# Patient Record
Sex: MALE | Race: OTHER | HISPANIC OR LATINO | URBAN - METROPOLITAN AREA
[De-identification: names, ages, dates, MRNs, and addresses within clinical notes are randomized per-mention and may not be internally consistent; named-entity substitution may affect disease eponyms.]

---

## 2019-04-20 ENCOUNTER — INPATIENT (INPATIENT)
Facility: HOSPITAL | Age: 59
LOS: 0 days | Discharge: HOME | DRG: 313 | End: 2019-04-21
Attending: HOSPITALIST | Admitting: HOSPITALIST
Payer: COMMERCIAL

## 2019-04-20 VITALS
HEIGHT: 66 IN | OXYGEN SATURATION: 98 % | DIASTOLIC BLOOD PRESSURE: 96 MMHG | HEART RATE: 58 BPM | WEIGHT: 223.99 LBS | TEMPERATURE: 98 F | RESPIRATION RATE: 16 BRPM | SYSTOLIC BLOOD PRESSURE: 171 MMHG

## 2019-04-20 DIAGNOSIS — I24.9 ACUTE ISCHEMIC HEART DISEASE, UNSPECIFIED: ICD-10-CM

## 2019-04-20 DIAGNOSIS — E78.00 PURE HYPERCHOLESTEROLEMIA, UNSPECIFIED: ICD-10-CM

## 2019-04-20 DIAGNOSIS — I10 ESSENTIAL (PRIMARY) HYPERTENSION: ICD-10-CM

## 2019-04-20 DIAGNOSIS — E66.9 OBESITY, UNSPECIFIED: ICD-10-CM

## 2019-04-20 DIAGNOSIS — I21.4 NON-ST ELEVATION (NSTEMI) MYOCARDIAL INFARCTION: ICD-10-CM

## 2019-04-20 LAB
ALBUMIN SERPL ELPH-MCNC: 4.1 G/DL — SIGNIFICANT CHANGE UP (ref 3.3–5.2)
ALP SERPL-CCNC: 83 U/L — SIGNIFICANT CHANGE UP (ref 40–120)
ALT FLD-CCNC: 34 U/L — SIGNIFICANT CHANGE UP
ANION GAP SERPL CALC-SCNC: 11 MMOL/L — SIGNIFICANT CHANGE UP (ref 5–17)
APTT BLD: 28.5 SEC — SIGNIFICANT CHANGE UP (ref 27.5–36.3)
APTT BLD: 29.9 SEC — SIGNIFICANT CHANGE UP (ref 27.5–36.3)
AST SERPL-CCNC: 26 U/L — SIGNIFICANT CHANGE UP
BASOPHILS # BLD AUTO: 0 K/UL — SIGNIFICANT CHANGE UP (ref 0–0.2)
BASOPHILS NFR BLD AUTO: 0.5 % — SIGNIFICANT CHANGE UP (ref 0–2)
BILIRUB SERPL-MCNC: 0.2 MG/DL — LOW (ref 0.4–2)
BUN SERPL-MCNC: 17 MG/DL — SIGNIFICANT CHANGE UP (ref 8–20)
CALCIUM SERPL-MCNC: 8.6 MG/DL — SIGNIFICANT CHANGE UP (ref 8.6–10.2)
CHLORIDE SERPL-SCNC: 103 MMOL/L — SIGNIFICANT CHANGE UP (ref 98–107)
CK MB CFR SERPL CALC: 4.5 NG/ML — SIGNIFICANT CHANGE UP (ref 0–6.7)
CK MB CFR SERPL CALC: 6.9 NG/ML — HIGH (ref 0–6.7)
CK SERPL-CCNC: 612 U/L — HIGH (ref 30–200)
CK SERPL-CCNC: 702 U/L — HIGH (ref 30–200)
CO2 SERPL-SCNC: 22 MMOL/L — SIGNIFICANT CHANGE UP (ref 22–29)
CREAT SERPL-MCNC: 0.86 MG/DL — SIGNIFICANT CHANGE UP (ref 0.5–1.3)
EOSINOPHIL # BLD AUTO: 0.3 K/UL — SIGNIFICANT CHANGE UP (ref 0–0.5)
EOSINOPHIL NFR BLD AUTO: 4 % — SIGNIFICANT CHANGE UP (ref 0–5)
GLUCOSE SERPL-MCNC: 118 MG/DL — HIGH (ref 70–115)
HCT VFR BLD CALC: 44.4 % — SIGNIFICANT CHANGE UP (ref 42–52)
HCT VFR BLD CALC: 45 % — SIGNIFICANT CHANGE UP (ref 42–52)
HGB BLD-MCNC: 14.7 G/DL — SIGNIFICANT CHANGE UP (ref 14–18)
HGB BLD-MCNC: 14.9 G/DL — SIGNIFICANT CHANGE UP (ref 14–18)
HIV 1 & 2 AB SERPL IA.RAPID: SIGNIFICANT CHANGE UP
INR BLD: 0.9 RATIO — SIGNIFICANT CHANGE UP (ref 0.88–1.16)
LYMPHOCYTES # BLD AUTO: 1.8 K/UL — SIGNIFICANT CHANGE UP (ref 1–4.8)
LYMPHOCYTES # BLD AUTO: 26.8 % — SIGNIFICANT CHANGE UP (ref 20–55)
MCHC RBC-ENTMCNC: 31.7 PG — HIGH (ref 27–31)
MCHC RBC-ENTMCNC: 32.3 PG — HIGH (ref 27–31)
MCHC RBC-ENTMCNC: 32.7 G/DL — SIGNIFICANT CHANGE UP (ref 32–36)
MCHC RBC-ENTMCNC: 33.6 G/DL — SIGNIFICANT CHANGE UP (ref 32–36)
MCV RBC AUTO: 96.1 FL — HIGH (ref 80–94)
MCV RBC AUTO: 97 FL — HIGH (ref 80–94)
MONOCYTES # BLD AUTO: 0.6 K/UL — SIGNIFICANT CHANGE UP (ref 0–0.8)
MONOCYTES NFR BLD AUTO: 8.8 % — SIGNIFICANT CHANGE UP (ref 3–10)
NEUTROPHILS # BLD AUTO: 3.9 K/UL — SIGNIFICANT CHANGE UP (ref 1.8–8)
NEUTROPHILS NFR BLD AUTO: 59.6 % — SIGNIFICANT CHANGE UP (ref 37–73)
PLATELET # BLD AUTO: 277 K/UL — SIGNIFICANT CHANGE UP (ref 150–400)
PLATELET # BLD AUTO: 327 K/UL — SIGNIFICANT CHANGE UP (ref 150–400)
POTASSIUM SERPL-MCNC: 3.8 MMOL/L — SIGNIFICANT CHANGE UP (ref 3.5–5.3)
POTASSIUM SERPL-SCNC: 3.8 MMOL/L — SIGNIFICANT CHANGE UP (ref 3.5–5.3)
PROT SERPL-MCNC: 6.8 G/DL — SIGNIFICANT CHANGE UP (ref 6.6–8.7)
PROTHROM AB SERPL-ACNC: 10.3 SEC — SIGNIFICANT CHANGE UP (ref 10–12.9)
RBC # BLD: 4.62 M/UL — SIGNIFICANT CHANGE UP (ref 4.6–6.2)
RBC # BLD: 4.64 M/UL — SIGNIFICANT CHANGE UP (ref 4.6–6.2)
RBC # FLD: 12.8 % — SIGNIFICANT CHANGE UP (ref 11–15.6)
RBC # FLD: 13 % — SIGNIFICANT CHANGE UP (ref 11–15.6)
SODIUM SERPL-SCNC: 136 MMOL/L — SIGNIFICANT CHANGE UP (ref 135–145)
TROPONIN T SERPL-MCNC: 0.02 NG/ML — SIGNIFICANT CHANGE UP (ref 0–0.06)
TROPONIN T SERPL-MCNC: 0.05 NG/ML — SIGNIFICANT CHANGE UP (ref 0–0.06)
TROPONIN T SERPL-MCNC: 0.07 NG/ML — HIGH (ref 0–0.06)
TROPONIN T SERPL-MCNC: 0.08 NG/ML — HIGH (ref 0–0.06)
TROPONIN T SERPL-MCNC: 0.17 NG/ML — HIGH (ref 0–0.06)
WBC # BLD: 6.6 K/UL — SIGNIFICANT CHANGE UP (ref 4.8–10.8)
WBC # BLD: 7 K/UL — SIGNIFICANT CHANGE UP (ref 4.8–10.8)
WBC # FLD AUTO: 6.6 K/UL — SIGNIFICANT CHANGE UP (ref 4.8–10.8)
WBC # FLD AUTO: 7 K/UL — SIGNIFICANT CHANGE UP (ref 4.8–10.8)

## 2019-04-20 PROCEDURE — 99223 1ST HOSP IP/OBS HIGH 75: CPT

## 2019-04-20 PROCEDURE — 99218: CPT

## 2019-04-20 PROCEDURE — 93306 TTE W/DOPPLER COMPLETE: CPT | Mod: 26

## 2019-04-20 PROCEDURE — 71045 X-RAY EXAM CHEST 1 VIEW: CPT | Mod: 26

## 2019-04-20 PROCEDURE — 93010 ELECTROCARDIOGRAM REPORT: CPT | Mod: 76

## 2019-04-20 RX ORDER — HEPARIN SODIUM 5000 [USP'U]/ML
6000 INJECTION INTRAVENOUS; SUBCUTANEOUS EVERY 6 HOURS
Qty: 0 | Refills: 0 | Status: DISCONTINUED | OUTPATIENT
Start: 2019-04-20 | End: 2019-04-20

## 2019-04-20 RX ORDER — INFLUENZA VIRUS VACCINE 15; 15; 15; 15 UG/.5ML; UG/.5ML; UG/.5ML; UG/.5ML
0.5 SUSPENSION INTRAMUSCULAR ONCE
Qty: 0 | Refills: 0 | Status: COMPLETED | OUTPATIENT
Start: 2019-04-20 | End: 2019-04-20

## 2019-04-20 RX ORDER — SODIUM CHLORIDE 9 MG/ML
1000 INJECTION INTRAMUSCULAR; INTRAVENOUS; SUBCUTANEOUS ONCE
Qty: 0 | Refills: 0 | Status: COMPLETED | OUTPATIENT
Start: 2019-04-20 | End: 2019-04-20

## 2019-04-20 RX ORDER — HEPARIN SODIUM 5000 [USP'U]/ML
INJECTION INTRAVENOUS; SUBCUTANEOUS
Qty: 25000 | Refills: 0 | Status: DISCONTINUED | OUTPATIENT
Start: 2019-04-20 | End: 2019-04-20

## 2019-04-20 RX ORDER — CLOPIDOGREL BISULFATE 75 MG/1
75 TABLET, FILM COATED ORAL DAILY
Qty: 0 | Refills: 0 | Status: DISCONTINUED | OUTPATIENT
Start: 2019-04-20 | End: 2019-04-21

## 2019-04-20 RX ORDER — HEPARIN SODIUM 5000 [USP'U]/ML
5000 INJECTION INTRAVENOUS; SUBCUTANEOUS ONCE
Qty: 0 | Refills: 0 | Status: COMPLETED | OUTPATIENT
Start: 2019-04-20 | End: 2019-04-20

## 2019-04-20 RX ORDER — ASPIRIN/CALCIUM CARB/MAGNESIUM 324 MG
81 TABLET ORAL DAILY
Qty: 0 | Refills: 0 | Status: DISCONTINUED | OUTPATIENT
Start: 2019-04-20 | End: 2019-04-21

## 2019-04-20 RX ORDER — SODIUM CHLORIDE 9 MG/ML
3 INJECTION INTRAMUSCULAR; INTRAVENOUS; SUBCUTANEOUS ONCE
Qty: 0 | Refills: 0 | Status: COMPLETED | OUTPATIENT
Start: 2019-04-20 | End: 2019-04-20

## 2019-04-20 RX ORDER — LISINOPRIL 2.5 MG/1
10 TABLET ORAL DAILY
Qty: 0 | Refills: 0 | Status: DISCONTINUED | OUTPATIENT
Start: 2019-04-20 | End: 2019-04-21

## 2019-04-20 RX ORDER — ENOXAPARIN SODIUM 100 MG/ML
100 INJECTION SUBCUTANEOUS
Qty: 0 | Refills: 0 | Status: DISCONTINUED | OUTPATIENT
Start: 2019-04-20 | End: 2019-04-21

## 2019-04-20 RX ORDER — METOPROLOL TARTRATE 50 MG
25 TABLET ORAL
Qty: 0 | Refills: 0 | Status: DISCONTINUED | OUTPATIENT
Start: 2019-04-20 | End: 2019-04-21

## 2019-04-20 RX ADMIN — SODIUM CHLORIDE 1000 MILLILITER(S): 9 INJECTION INTRAMUSCULAR; INTRAVENOUS; SUBCUTANEOUS at 08:41

## 2019-04-20 RX ADMIN — SODIUM CHLORIDE 3 MILLILITER(S): 9 INJECTION INTRAMUSCULAR; INTRAVENOUS; SUBCUTANEOUS at 06:45

## 2019-04-20 RX ADMIN — SODIUM CHLORIDE 1000 MILLILITER(S): 9 INJECTION INTRAMUSCULAR; INTRAVENOUS; SUBCUTANEOUS at 10:32

## 2019-04-20 RX ADMIN — Medication 81 MILLIGRAM(S): at 17:23

## 2019-04-20 RX ADMIN — ENOXAPARIN SODIUM 100 MILLIGRAM(S): 100 INJECTION SUBCUTANEOUS at 21:10

## 2019-04-20 RX ADMIN — HEPARIN SODIUM 5000 UNIT(S): 5000 INJECTION INTRAVENOUS; SUBCUTANEOUS at 11:05

## 2019-04-20 RX ADMIN — LISINOPRIL 10 MILLIGRAM(S): 2.5 TABLET ORAL at 17:23

## 2019-04-20 RX ADMIN — HEPARIN SODIUM 1000 UNIT(S)/HR: 5000 INJECTION INTRAVENOUS; SUBCUTANEOUS at 11:06

## 2019-04-20 RX ADMIN — Medication 25 MILLIGRAM(S): at 17:37

## 2019-04-20 RX ADMIN — CLOPIDOGREL BISULFATE 75 MILLIGRAM(S): 75 TABLET, FILM COATED ORAL at 17:23

## 2019-04-20 NOTE — ED ADULT NURSE REASSESSMENT NOTE - GENERAL PATIENT STATE
cooperative/resting/sleeping/comfortable appearance/improvement verbalized/family/SO at bedside/smiling/interactive

## 2019-04-20 NOTE — ED ADULT TRIAGE NOTE - CHIEF COMPLAINT QUOTE
Patient A&Ox4, denies any pain or discomfort. Stated woke up from sleep appx 40 mins ago & felt pain to center of chest. Denies any pain at this time. Stated BP was 202/100 & took 25mg Metoprolol. Patient received 324mg baby asa PTA.

## 2019-04-20 NOTE — ED CDU PROVIDER INITIAL DAY NOTE - ATTENDING CONTRIBUTION TO CARE
Shell SINHA- 59 Y/O M with h/o htn, hld, cad, s/p pic with stent few yrs ago p/w chest pain last night, no fever, chills, cough, nausea, vomiting, diarrhea.  No chest pain now, placed in cdu for stress test    pt is alert, well appearing male, s1s2 normal reg, b/l clear breath sounds, abd soft, nt, nd, normal bowel sounds, neuro exam aox3, cn 2-12 intact, no focal deficits, skin arm, dry, good turgor

## 2019-04-20 NOTE — ED ADULT NURSE NOTE - OBJECTIVE STATEMENT
Patient present to ED with c/o of chest pain that began at 5 am. Pt denies any N/V/SOB/ Weakness or syncope. Patient presents to ED A/Ox4, VSS, Respirations are even and unlabored, lungs cta, +bowel x4 quads, abdomen soft, nontender/nondistended, skin w/d/i.

## 2019-04-20 NOTE — ED PROVIDER NOTE - OBJECTIVE STATEMENT
59 yo M presents to ED c/o non-radiating substernal chest pain which started at 0510 this AM after waking up to go to the bathroom.  Pt denies any assoc radiation, N/V, diaphoresis or syncope.  Pt with hx of CAD s/p coronary stenting x 1 in 2010.  Pt followed by [facility]/Dr. D'Agate.

## 2019-04-20 NOTE — ED CDU PROVIDER DISPOSITION NOTE - ATTENDING CONTRIBUTION TO CARE
Shell SINHA- 59 Y/O M with h/o htn, hld, cad, s/p pci with stent few yrs ago p/w chest pain last night, no fever, chills, cough, nausea, vomiting, diarrhea.  No chest pain now, placed in cdu for stress test    pt is alert, well appearing male, s1s2 normal reg, b/l clear breath sounds, abd soft, nt, nd, normal bowel sounds, neuro exam aox3, cn 2-12 intact, no focal deficits, skin arm, dry, good turgor.

## 2019-04-20 NOTE — ED CDU PROVIDER INITIAL DAY NOTE - NOTES
Pt with + second tropin. EKG no changes. Case d/w Cardio, heparin initiated and admitted to hospitalist service

## 2019-04-20 NOTE — H&P ADULT - PROBLEM SELECTOR PLAN 1
Aspirin 81mg po daily   Plavix 75mg po daily   metoprolol 25mg po bid  Lovenox therapeutic dosing    trend troponin   TSH , Lipid profile fasting.   Cardiology is following. Cardiac heart group dr Mackey for Monday 4/22/19  Planning a cardiac cath Aspirin 81mg po daily   Plavix 75mg po daily   metoprolol 25mg po bid  Lovenox therapeutic dosing    trend troponin   TSH , Lipid profile fasting.   Cardiology is following.  dr Mackey of [facility] plan cardiac cath for Monday 4/22/19  Planning a cardiac cath

## 2019-04-20 NOTE — ED PROVIDER NOTE - CLINICAL SUMMARY MEDICAL DECISION MAKING FREE TEXT BOX
EKG NSR with no acute changes,  Pt with significant cardiac hx.  will monitor, check serial CE and have Cardio eval pt

## 2019-04-20 NOTE — ED ADULT NURSE NOTE - CHPI ED NUR SYMPTOMS NEG
no diaphoresis/no fever/no chills/no back pain/no shortness of breath/no congestion/no dizziness/no vomiting/no syncope

## 2019-04-20 NOTE — CONSULT NOTE ADULT - SUBJECTIVE AND OBJECTIVE BOX
[facility]                                                    [address], [address], [place] [address]                                                         PHONE: [phone]    FAX: [phone] [address], [address], [place] [address]                                                 PHONE: [phone]    FAX: [phone]  *******************************************************************************    Reason for Consult: Chest pain    HPI:  MARY BETH CHA is a 58y Male with PMH of CAD, remote stent in 2010 when he was treated with dual antiplatelet regimen with Plavix and ASA for a year and now on ASA only presents to ED c/o non-radiating substernal chest pain which started at 0510 this AM after waking up to go to the bathroom.  Pt denies any assoc radiation, N/V, diaphoresis or syncope. In ER he was found to have uncontrolled HTN with BP of 171/96, initial Trop negative and second Trop 0.17. Comfortable in the bed at present time, with no recurrent CP, no new cardiac c/o. Last cardiac w/u in the office more then year ago.     PAST MEDICAL & SURGICAL HISTORY:  HTN (hypertension)  Stented coronary artery  High cholesterol: UNKNOWN MEDICINE      No Known Allergies      MEDICATIONS  (STANDING):  heparin  Infusion.  Unit(s)/Hr (10 mL/Hr) IV Continuous <Continuous>  influenza   Vaccine 0.5 milliLiter(s) IntraMuscular once    MEDICATIONS  (PRN):  heparin  Injectable 6000 Unit(s) IV Push every 6 hours PRN For aPTT less than 40      Social History: no active tobacco / EtOH / IVDA    Family History:     ROS: As noted above, otherwise unremarkable.    Vital Signs Last 24 Hrs  T(C): 36.7 (20 Apr 2019 11:45), Max: 36.8 (20 Apr 2019 05:46)  T(F): 98.1 (20 Apr 2019 11:45), Max: 98.2 (20 Apr 2019 05:46)  HR: 58 (20 Apr 2019 11:45) (58 - 59)  BP: 154/90 (20 Apr 2019 11:45) (149/84 - 171/96)  BP(mean): 112 (20 Apr 2019 11:45) (112 - 112)  RR: 18 (20 Apr 2019 11:45) (16 - 18)  SpO2: 99% (20 Apr 2019 11:45) (98% - 99%)    I&O's Detail    I&O's Summary      PHYSICAL EXAM:  General: Appears well developed, well nourished, no acute distress  HEENT: Head: normocephalic, atraumatic  Eyes: Pupils equal and reactive  Neck: Supple, no carotid bruit, no JVD, no HJR  CARDIOVASCULAR: Normal S1 and S2, no murmur, rub, or gallop  LUNGS: Clear to auscultation bilaterally, no rales, rhonchi or wheeze  ABDOMEN: Soft, nontender, non-distended, positive bowel sounds, no mass or bruit  EXTREMITIES: No edema, distal pulses WNL  SKIN: Warm and dry with normal turgor  NEURO: Alert & oriented x 3, grossly intact  PSYCH: normal mood and affect    LABS:                        14.9   6.6   )-----------( 327      ( 20 Apr 2019 06:49 )             44.4     04-20    136  |  103  |  17.0  ----------------------------<  118<H>  3.8   |  22.0  |  0.86    Ca    8.6      20 Apr 2019 06:49    TPro  6.8  /  Alb  4.1  /  TBili  0.2<L>  /  DBili  x   /  AST  26  /  ALT  34  /  AlkPhos  83  04-20    CARDIAC MARKERS ( 20 Apr 2019 09:33 )  x     / 0.17 ng/mL / x     / x     / x      CARDIAC MARKERS ( 20 Apr 2019 06:49 )  x     / 0.02 ng/mL / 612 U/L / x     / 4.5 ng/mL      PT/INR - ( 20 Apr 2019 06:49 )   PT: 10.3 sec;   INR: 0.90 ratio         PTT - ( 20 Apr 2019 06:49 )  PTT:29.9 sec    RADIOLOGY & ADDITIONAL STUDIES:    ECG: < from: 12 Lead ECG (04.20.19 @ 09:54) >  Sinus bradycardia  Otherwise normal ECG      Assessment and Plan:  In summary, MARY BETH CHA is a 58y Male with past medical history significant for with PMH of CAD, remote stent in 2010 when he was treated with dual antiplatelet regimen with Plavix and ASA for a year and now on ASA only presents to ED c/o non-radiating substernal chest pain which started at 0510 this AM after waking up to go to the bathroom.  Pt denies any assoc symptoms. In ER he was found to have uncontrolled HTN with BP of 171/96, initial Trop negative and second Trop 0.17. No clinical signs of overt CHF, cardiac ischemia. Borderline CE most likely secondary to uncontrolled HTN. Admitted with chest pain, uncontrolled HTN, rule ACS.        - Monitor on telemetry  - Check troponin x3  - Repeat EKGs  - Echocardiogram  - ASA/statins/ACEI/BB for now   - If he develops recurrent CP and or CE continue to trend up, will consider for Cardiac Catheterization monday. .  - Rhythm/hemodynamics stable   - AC consider Lovenox 1 mg/kg S/Q every 12 hrs for now     We will follow with you.  Thank you for allowing me to participate in the care of your patient.      Sincerely,

## 2019-04-20 NOTE — ED CDU PROVIDER DISPOSITION NOTE - CLINICAL COURSE
Pt presented to to ED with CP which resolved after 30min. Pt had serial trop and ruled in after second troponin. Heparin intimated, pending cardio and admitted to hospitalist service. Case d/w Dr Price

## 2019-04-20 NOTE — H&P ADULT - ASSESSMENT
58 yr male with CAD and previous cardiac stent placement presenting with chest pain and positive troponin concerning for acute coronary syndrome.

## 2019-04-20 NOTE — H&P ADULT - NSHPPHYSICALEXAM_GEN_ALL_CORE
Obese   Normocephalic   EOMI PERRL  Neck supple NO JVD  S1 and S2 regular   CTA B  Abd soft + BS not tender   No pedal edema. No calf tenderness  AAOX3 no focal neurologic deficit  No skin rash , no skin eruption

## 2019-04-20 NOTE — H&P ADULT - NSICDXPASTMEDICALHX_GEN_ALL_CORE_FT
PAST MEDICAL HISTORY:  High cholesterol UNKNOWN MEDICINE    HTN (hypertension)     Stented coronary artery

## 2019-04-20 NOTE — ED CDU PROVIDER INITIAL DAY NOTE - OBJECTIVE STATEMENT
59 yo M PMH HTN, HLD, CAD/PCI in 2010 in ASA/Plavix presents to ED c/o non-radiating substernal chest pain which started at 0510 this AM after waking up to go to the bathroom.  Pain 7/10 and lasted approx 30 min. Pt denies any assoc radiation, N/V, diaphoresis or syncope.    Pt followed by [facility]/Dr. D'Agate. 57 yo M PMH HTN, HLD, CAD/PCI in 2010 in ASA/Plavix presents to ED c/o non-radiating substernal chest pain which started at 0510 this AM after waking up to go to the bathroom.  Pain 7/10 and lasted approx 30 min. Pt took ASA and metoprolol PTA. denies any assoc radiation, N/V, diaphoresis or syncope.    Pt followed by [facility]/Dr. D'Agate.

## 2019-04-20 NOTE — H&P ADULT - HISTORY OF PRESENT ILLNESS
58 yr male that is obese, history of CAD had stent placed 2010, Hypertension , Dyslipidemia. Present substernal chest pain started 5am day of admission, lasted about 30 minutes. Relieved with total 4 tabs of baby aspirin and 25mg metoprolol pill.  No diaphoresis, no dizziness ,no nausea or vomiting.  In ED second set of cardiac enzyme showing troponin elevation 0.17.  EKG normal sinus. CXR no infiltrates.

## 2019-04-21 ENCOUNTER — TRANSCRIPTION ENCOUNTER (OUTPATIENT)
Age: 59
End: 2019-04-21

## 2019-04-21 VITALS
DIASTOLIC BLOOD PRESSURE: 81 MMHG | SYSTOLIC BLOOD PRESSURE: 122 MMHG | HEART RATE: 73 BPM | OXYGEN SATURATION: 95 % | RESPIRATION RATE: 18 BRPM | TEMPERATURE: 98 F

## 2019-04-21 LAB
ANION GAP SERPL CALC-SCNC: 13 MMOL/L — SIGNIFICANT CHANGE UP (ref 5–17)
BUN SERPL-MCNC: 19 MG/DL — SIGNIFICANT CHANGE UP (ref 8–20)
CALCIUM SERPL-MCNC: 9.2 MG/DL — SIGNIFICANT CHANGE UP (ref 8.6–10.2)
CHLORIDE SERPL-SCNC: 102 MMOL/L — SIGNIFICANT CHANGE UP (ref 98–107)
CHOLEST SERPL-MCNC: 242 MG/DL — HIGH (ref 110–199)
CO2 SERPL-SCNC: 24 MMOL/L — SIGNIFICANT CHANGE UP (ref 22–29)
CREAT SERPL-MCNC: 0.98 MG/DL — SIGNIFICANT CHANGE UP (ref 0.5–1.3)
GLUCOSE SERPL-MCNC: 91 MG/DL — SIGNIFICANT CHANGE UP (ref 70–115)
HAV IGM SER-ACNC: SIGNIFICANT CHANGE UP
HBV CORE IGM SER-ACNC: SIGNIFICANT CHANGE UP
HBV SURFACE AG SER-ACNC: SIGNIFICANT CHANGE UP
HCT VFR BLD CALC: 47.2 % — SIGNIFICANT CHANGE UP (ref 42–52)
HCV AB S/CO SERPL IA: 0.07 S/CO — SIGNIFICANT CHANGE UP (ref 0–0.99)
HCV AB S/CO SERPL IA: 0.07 S/CO — SIGNIFICANT CHANGE UP (ref 0–0.99)
HCV AB SERPL-IMP: SIGNIFICANT CHANGE UP
HCV AB SERPL-IMP: SIGNIFICANT CHANGE UP
HDLC SERPL-MCNC: 35 MG/DL — LOW
HGB BLD-MCNC: 15.6 G/DL — SIGNIFICANT CHANGE UP (ref 14–18)
LIPID PNL WITH DIRECT LDL SERPL: 157 MG/DL — SIGNIFICANT CHANGE UP
MCHC RBC-ENTMCNC: 32.1 PG — HIGH (ref 27–31)
MCHC RBC-ENTMCNC: 33.1 G/DL — SIGNIFICANT CHANGE UP (ref 32–36)
MCV RBC AUTO: 97.1 FL — HIGH (ref 80–94)
PLATELET # BLD AUTO: 292 K/UL — SIGNIFICANT CHANGE UP (ref 150–400)
POTASSIUM SERPL-MCNC: 3.9 MMOL/L — SIGNIFICANT CHANGE UP (ref 3.5–5.3)
POTASSIUM SERPL-SCNC: 3.9 MMOL/L — SIGNIFICANT CHANGE UP (ref 3.5–5.3)
RBC # BLD: 4.86 M/UL — SIGNIFICANT CHANGE UP (ref 4.6–6.2)
RBC # FLD: 13 % — SIGNIFICANT CHANGE UP (ref 11–15.6)
SODIUM SERPL-SCNC: 139 MMOL/L — SIGNIFICANT CHANGE UP (ref 135–145)
TOTAL CHOLESTEROL/HDL RATIO MEASUREMENT: 7 RATIO — SIGNIFICANT CHANGE UP (ref 3.4–9.6)
TRIGL SERPL-MCNC: 248 MG/DL — HIGH (ref 10–200)
TSH SERPL-MCNC: 1.66 UIU/ML — SIGNIFICANT CHANGE UP (ref 0.27–4.2)
WBC # BLD: 7.5 K/UL — SIGNIFICANT CHANGE UP (ref 4.8–10.8)
WBC # FLD AUTO: 7.5 K/UL — SIGNIFICANT CHANGE UP (ref 4.8–10.8)

## 2019-04-21 PROCEDURE — C8929: CPT

## 2019-04-21 PROCEDURE — G0378: CPT

## 2019-04-21 PROCEDURE — 85027 COMPLETE CBC AUTOMATED: CPT

## 2019-04-21 PROCEDURE — 71045 X-RAY EXAM CHEST 1 VIEW: CPT

## 2019-04-21 PROCEDURE — 36415 COLL VENOUS BLD VENIPUNCTURE: CPT

## 2019-04-21 PROCEDURE — 84484 ASSAY OF TROPONIN QUANT: CPT

## 2019-04-21 PROCEDURE — 80053 COMPREHEN METABOLIC PANEL: CPT

## 2019-04-21 PROCEDURE — 85730 THROMBOPLASTIN TIME PARTIAL: CPT

## 2019-04-21 PROCEDURE — 86803 HEPATITIS C AB TEST: CPT

## 2019-04-21 PROCEDURE — 80061 LIPID PANEL: CPT

## 2019-04-21 PROCEDURE — 96361 HYDRATE IV INFUSION ADD-ON: CPT

## 2019-04-21 PROCEDURE — 86703 HIV-1/HIV-2 1 RESULT ANTBDY: CPT

## 2019-04-21 PROCEDURE — 85610 PROTHROMBIN TIME: CPT

## 2019-04-21 PROCEDURE — 80048 BASIC METABOLIC PNL TOTAL CA: CPT

## 2019-04-21 PROCEDURE — 99239 HOSP IP/OBS DSCHRG MGMT >30: CPT

## 2019-04-21 PROCEDURE — T1013: CPT

## 2019-04-21 PROCEDURE — 82553 CREATINE MB FRACTION: CPT

## 2019-04-21 PROCEDURE — 93005 ELECTROCARDIOGRAM TRACING: CPT

## 2019-04-21 PROCEDURE — 99285 EMERGENCY DEPT VISIT HI MDM: CPT | Mod: 25

## 2019-04-21 PROCEDURE — 80074 ACUTE HEPATITIS PANEL: CPT

## 2019-04-21 PROCEDURE — 82550 ASSAY OF CK (CPK): CPT

## 2019-04-21 PROCEDURE — 93010 ELECTROCARDIOGRAM REPORT: CPT

## 2019-04-21 PROCEDURE — 96360 HYDRATION IV INFUSION INIT: CPT

## 2019-04-21 PROCEDURE — 84443 ASSAY THYROID STIM HORMONE: CPT

## 2019-04-21 RX ORDER — LISINOPRIL 2.5 MG/1
10 TABLET ORAL ONCE
Qty: 0 | Refills: 0 | Status: COMPLETED | OUTPATIENT
Start: 2019-04-21 | End: 2019-04-21

## 2019-04-21 RX ORDER — LISINOPRIL 2.5 MG/1
1 TABLET ORAL
Qty: 30 | Refills: 0
Start: 2019-04-21 | End: 2019-05-20

## 2019-04-21 RX ORDER — METOPROLOL TARTRATE 50 MG
1 TABLET ORAL
Qty: 60 | Refills: 0
Start: 2019-04-21 | End: 2019-05-20

## 2019-04-21 RX ORDER — LISINOPRIL 2.5 MG/1
20 TABLET ORAL DAILY
Qty: 0 | Refills: 0 | Status: DISCONTINUED | OUTPATIENT
Start: 2019-04-21 | End: 2019-04-21

## 2019-04-21 RX ORDER — ASPIRIN/CALCIUM CARB/MAGNESIUM 324 MG
1 TABLET ORAL
Qty: 30 | Refills: 0
Start: 2019-04-21 | End: 2019-05-20

## 2019-04-21 RX ORDER — LISINOPRIL 2.5 MG/1
20 TABLET ORAL DAILY
Qty: 0 | Refills: 0 | Status: CANCELLED | OUTPATIENT
Start: 2019-04-22 | End: 2019-04-21

## 2019-04-21 RX ADMIN — LISINOPRIL 10 MILLIGRAM(S): 2.5 TABLET ORAL at 09:11

## 2019-04-21 RX ADMIN — CLOPIDOGREL BISULFATE 75 MILLIGRAM(S): 75 TABLET, FILM COATED ORAL at 07:31

## 2019-04-21 RX ADMIN — Medication 25 MILLIGRAM(S): at 05:08

## 2019-04-21 RX ADMIN — LISINOPRIL 10 MILLIGRAM(S): 2.5 TABLET ORAL at 05:08

## 2019-04-21 RX ADMIN — Medication 81 MILLIGRAM(S): at 07:31

## 2019-04-21 RX ADMIN — ENOXAPARIN SODIUM 100 MILLIGRAM(S): 100 INJECTION SUBCUTANEOUS at 07:30

## 2019-04-21 NOTE — DISCHARGE NOTE PROVIDER - HOSPITAL COURSE
58 yr male that is obese, history of CAD had stent placed 2010, Hypertension , Dyslipidemia. Present substernal chest pain started 5am day of admission, lasted about 30 minutes. Relieved with total 4 tabs of baby aspirin and 25mg metoprolol pill.  No diaphoresis, no dizziness ,no nausea or vomiting.  In ED second set of cardiac enzyme showing troponin elevation 0.17.  EKG normal sinus. CXR no infiltrates.      58 yr male with CAD and previous cardiac stent placement presenting with chest pain and positive troponin concerning for acute coronary syndrome.         ·  Problem: Acute coronary syndromes.  Plan: Aspirin 81mg po daily     Plavix 75mg po daily     metoprolol 25mg po bid    troponin trending down    TSH  WNL     Lipid profile fasting.  Cholesterol 242. Triglyceride 248.      Cardiology is following.  dr Mackey of [facility] follow up outpatient in I week        ·  Problem: Essential hypertension.  Plan:     metoprolol 25mg po bid     Lisinopril 20mg po daily.          Problem/Plan - 3:    ·  Problem: High cholesterol.  Plan: Atorvastatin 40mg po daily.          Problem/Plan - 4:    ·  Problem: Obesity (BMI 30-39.9).  Plan: Counselled on life style modification via diet and exercise for weight loss.

## 2019-04-21 NOTE — DISCHARGE NOTE NURSING/CASE MANAGEMENT/SOCIAL WORK - NSDCDPATPORTLINK_GEN_ALL_CORE
You can access the [facility] Patient Portal, offered by [facility], by registering with the following website: [email or web address]

## 2019-04-21 NOTE — DISCHARGE NOTE PROVIDER - NSDCCPCAREPLAN_GEN_ALL_CORE_FT
PRINCIPAL DISCHARGE DIAGNOSIS  Diagnosis: NSTEMI (non-ST elevated myocardial infarction)  Assessment and Plan of Treatment:

## 2019-04-21 NOTE — DISCHARGE NOTE PROVIDER - CARE PROVIDER_API CALL
Salvador Mackey)  Cardiac Electrophysiology; Cardiovascular Disease  [address], [address]  [place] [address]  Phone: [phone]  Fax: [phone]  Follow Up Time:     Radha Loyd)  Family Medicine  [address]  [place] [address]  Phone: [phone]  Fax: [phone]  Follow Up Time:

## 2019-04-21 NOTE — PROGRESS NOTE ADULT - SUBJECTIVE AND OBJECTIVE BOX
[facility]                                                    [address], [address], [place] [address]                                                         PHONE: [phone]    FAX: [phone] [address], [address], [place] [address]                                                 PHONE: [phone]    FAX: [phone]  *******************************************************************************    Overnight events/Subjective Assessment: comfortable in the bed with no new c/o.     INTERPRETATION OF TELEMETRY (personally reviewed): SR in 50s     No Known Allergies    MEDICATIONS  (STANDING):  aspirin  chewable 81 milliGRAM(s) Oral daily  clopidogrel Tablet 75 milliGRAM(s) Oral daily  enoxaparin Injectable 100 milliGRAM(s) SubCutaneous two times a day  metoprolol tartrate 25 milliGRAM(s) Oral two times a day    MEDICATIONS  (PRN):      Vital Signs Last 24 Hrs  T(C): 36.8 (2019 05:03), Max: 37.1 (2019 14:55)  T(F): 98.2 (2019 05:03), Max: 98.8 (2019 14:55)  HR: 55 (2019 05:03) (45 - 58)  BP: 142/84 (2019 05:03) (140/84 - 154/90)  BP(mean): 112 (2019 11:45) (112 - 112)  RR: 16 (2019 05:03) (16 - 18)  SpO2: 96% (2019 05:03) (96% - 100%)    I&O's Detail    2019 07:01  -  2019 10:08  --------------------------------------------------------  IN:    Oral Fluid: 240 mL  Total IN: 240 mL    OUT:    Voided: 500 mL  Total OUT: 500 mL    Total NET: -260 mL        I&O's Summary    2019 07:01  -  2019 10:08  --------------------------------------------------------  IN: 240 mL / OUT: 500 mL / NET: -260 mL            PHYSICAL EXAM:  General: Appears well developed, well nourished, no acute distress  HEENT: Head: normocephalic, atraumatic  Eyes: Pupils equal and reactive  Neck: Supple, no carotid bruit, no JVD, no HJR  CARDIOVASCULAR: Normal S1 and S2, no murmur, rub, or gallop  LUNGS: Clear to auscultation bilaterally, no rales, rhonchi or wheeze  ABDOMEN: Soft, nontender, non-distended, positive bowel sounds, no mass or bruit  EXTREMITIES: No edema, distal pulses WNL  SKIN: Warm and dry with normal turgor  NEURO: Alert & oriented x 3, grossly intact  PSYCH: normal mood and affect        LABS:                        15.6   7.5   )-----------( 292      ( 2019 05:57 )             47.2     04-21    139  |  102  |  19.0  ----------------------------<  91  3.9   |  24.0  |  0.98    Ca    9.2      2019 05:57    TPro  6.8  /  Alb  4.1  /  TBili  0.2<L>  /  DBili  x   /  AST  26  /  ALT  34  /  AlkPhos  83  04-20    CARDIAC MARKERS ( 2019 23:32 )  x     / 0.05 ng/mL / x     / x     / x      CARDIAC MARKERS ( 2019 17:08 )  x     / 0.07 ng/mL / x     / x     / x      CARDIAC MARKERS ( 2019 14:17 )  x     / 0.08 ng/mL / 702 U/L / x     / 6.9 ng/mL  CARDIAC MARKERS ( 2019 09:33 )  x     / 0.17 ng/mL / x     / x     / x      CARDIAC MARKERS ( 2019 06:49 )  x     / 0.02 ng/mL / 612 U/L / x     / 4.5 ng/mL      PT/INR - ( 2019 06:49 )   PT: 10.3 sec;   INR: 0.90 ratio         PTT - ( 2019 19:25 )  PTT:28.5 sec  serum  Lipids:     Thyroid Stimulating Hormone, Serum: 1.66 uIU/mL ( @ 05:57)      RADIOLOGY & ADDITIONAL STUDIES:    EC19   Sinus bradycardia  With no new changes     Echo: < from: TTE Echo w/Cont Complete (19 @ 14:02) >  Summary:   1. Normal left ventricular internal cavity size.   2. Normal global left ventricular systolic function.   3. Left ventricular ejection fraction, by visual estimation, is 60 to   65%.   4. There is mild asymmetric left ventricular hypertrophy.   5. Spectral Doppler shows pseudonormal pattern of left ventricular   myocardial filling (Grade II diastolic dysfunction).      Assessment and Plan:  In summary, MARY BETH CHA is a 58y Male with past medical history significant for with PMH of CAD, remote stent in  when he was treated with dual antiplatelet regimen with Plavix and ASA for a year and now on ASA only presents to ED c/o non-radiating substernal chest pain which started at 0510 this AM after waking up to go to the bathroom.  Pt denies any assoc symptoms. In ER he was found to have uncontrolled HTN with BP of 171/96, initial Trop negative and second Trop 0.17. No clinical signs of overt CHF, cardiac ischemia. Borderline CE most likely secondary to uncontrolled HTN. Admitted with chest pain, uncontrolled HTN, rule ACS.        - Monitor on telemetry  - CE trending down, ACS ruled out    - Repeat EKG with no new changes   - Echocardiogram with nl LV function, no significant valv abn   - On ASA/Plavix/statins/ACEI/BB for now   - Rhythm/hemodynamics stable   - OK to DC Lovenox   - Will titrate Lisinopril to 20 mg daili  - OK to DC home on current meds later today for outpatient f/u early next week      We will follow with you.  Thank you for allowing me to participate in the care of your patient.

## 2020-05-13 ENCOUNTER — INPATIENT (INPATIENT)
Facility: HOSPITAL | Age: 60
LOS: 1 days | Discharge: HOME | DRG: 247 | End: 2020-05-15
Attending: FAMILY MEDICINE | Admitting: HOSPITALIST
Payer: COMMERCIAL

## 2020-05-13 VITALS
HEART RATE: 69 BPM | SYSTOLIC BLOOD PRESSURE: 154 MMHG | RESPIRATION RATE: 18 BRPM | TEMPERATURE: 98 F | DIASTOLIC BLOOD PRESSURE: 90 MMHG | OXYGEN SATURATION: 98 %

## 2020-05-13 DIAGNOSIS — I21.4 NON-ST ELEVATION (NSTEMI) MYOCARDIAL INFARCTION: ICD-10-CM

## 2020-05-13 DIAGNOSIS — Z95.5 PRESENCE OF CORONARY ANGIOPLASTY IMPLANT AND GRAFT: Chronic | ICD-10-CM

## 2020-05-13 LAB
ALBUMIN SERPL ELPH-MCNC: 4.1 G/DL — SIGNIFICANT CHANGE UP (ref 3.3–5.2)
ALP SERPL-CCNC: 79 U/L — SIGNIFICANT CHANGE UP (ref 40–120)
ALT FLD-CCNC: 27 U/L — SIGNIFICANT CHANGE UP
ANION GAP SERPL CALC-SCNC: 11 MMOL/L — SIGNIFICANT CHANGE UP (ref 5–17)
APTT BLD: 30.9 SEC — SIGNIFICANT CHANGE UP (ref 27.5–36.3)
AST SERPL-CCNC: 25 U/L — SIGNIFICANT CHANGE UP
BASOPHILS # BLD AUTO: 0.03 K/UL — SIGNIFICANT CHANGE UP (ref 0–0.2)
BASOPHILS NFR BLD AUTO: 0.4 % — SIGNIFICANT CHANGE UP (ref 0–2)
BILIRUB SERPL-MCNC: 0.2 MG/DL — LOW (ref 0.4–2)
BUN SERPL-MCNC: 18 MG/DL — SIGNIFICANT CHANGE UP (ref 8–20)
CALCIUM SERPL-MCNC: 9 MG/DL — SIGNIFICANT CHANGE UP (ref 8.6–10.2)
CHLORIDE SERPL-SCNC: 100 MMOL/L — SIGNIFICANT CHANGE UP (ref 98–107)
CO2 SERPL-SCNC: 26 MMOL/L — SIGNIFICANT CHANGE UP (ref 22–29)
CREAT SERPL-MCNC: 0.97 MG/DL — SIGNIFICANT CHANGE UP (ref 0.5–1.3)
EOSINOPHIL # BLD AUTO: 0.16 K/UL — SIGNIFICANT CHANGE UP (ref 0–0.5)
EOSINOPHIL NFR BLD AUTO: 2.2 % — SIGNIFICANT CHANGE UP (ref 0–6)
GLUCOSE SERPL-MCNC: 121 MG/DL — HIGH (ref 70–99)
HCT VFR BLD CALC: 44.9 % — SIGNIFICANT CHANGE UP (ref 39–50)
HGB BLD-MCNC: 15 G/DL — SIGNIFICANT CHANGE UP (ref 13–17)
IMM GRANULOCYTES NFR BLD AUTO: 0.3 % — SIGNIFICANT CHANGE UP (ref 0–1.5)
INR BLD: 0.93 RATIO — SIGNIFICANT CHANGE UP (ref 0.88–1.16)
LYMPHOCYTES # BLD AUTO: 1.85 K/UL — SIGNIFICANT CHANGE UP (ref 1–3.3)
LYMPHOCYTES # BLD AUTO: 25 % — SIGNIFICANT CHANGE UP (ref 13–44)
MCHC RBC-ENTMCNC: 32.7 PG — SIGNIFICANT CHANGE UP (ref 27–34)
MCHC RBC-ENTMCNC: 33.4 GM/DL — SIGNIFICANT CHANGE UP (ref 32–36)
MCV RBC AUTO: 97.8 FL — SIGNIFICANT CHANGE UP (ref 80–100)
MONOCYTES # BLD AUTO: 0.79 K/UL — SIGNIFICANT CHANGE UP (ref 0–0.9)
MONOCYTES NFR BLD AUTO: 10.7 % — SIGNIFICANT CHANGE UP (ref 2–14)
NEUTROPHILS # BLD AUTO: 4.55 K/UL — SIGNIFICANT CHANGE UP (ref 1.8–7.4)
NEUTROPHILS NFR BLD AUTO: 61.4 % — SIGNIFICANT CHANGE UP (ref 43–77)
PLATELET # BLD AUTO: 285 K/UL — SIGNIFICANT CHANGE UP (ref 150–400)
POTASSIUM SERPL-MCNC: 4 MMOL/L — SIGNIFICANT CHANGE UP (ref 3.5–5.3)
POTASSIUM SERPL-SCNC: 4 MMOL/L — SIGNIFICANT CHANGE UP (ref 3.5–5.3)
PROT SERPL-MCNC: 6.7 G/DL — SIGNIFICANT CHANGE UP (ref 6.6–8.7)
PROTHROM AB SERPL-ACNC: 10.5 SEC — SIGNIFICANT CHANGE UP (ref 10–12.9)
RBC # BLD: 4.59 M/UL — SIGNIFICANT CHANGE UP (ref 4.2–5.8)
RBC # FLD: 11.9 % — SIGNIFICANT CHANGE UP (ref 10.3–14.5)
SARS-COV-2 RNA SPEC QL NAA+PROBE: SIGNIFICANT CHANGE UP
SODIUM SERPL-SCNC: 137 MMOL/L — SIGNIFICANT CHANGE UP (ref 135–145)
TROPONIN T SERPL-MCNC: 0.12 NG/ML — HIGH (ref 0–0.06)
WBC # BLD: 7.4 K/UL — SIGNIFICANT CHANGE UP (ref 3.8–10.5)
WBC # FLD AUTO: 7.4 K/UL — SIGNIFICANT CHANGE UP (ref 3.8–10.5)

## 2020-05-13 PROCEDURE — 71045 X-RAY EXAM CHEST 1 VIEW: CPT | Mod: 26

## 2020-05-13 PROCEDURE — 99223 1ST HOSP IP/OBS HIGH 75: CPT

## 2020-05-13 PROCEDURE — 93306 TTE W/DOPPLER COMPLETE: CPT | Mod: 26

## 2020-05-13 PROCEDURE — 99285 EMERGENCY DEPT VISIT HI MDM: CPT

## 2020-05-13 PROCEDURE — 93010 ELECTROCARDIOGRAM REPORT: CPT

## 2020-05-13 RX ORDER — CLOPIDOGREL BISULFATE 75 MG/1
75 TABLET, FILM COATED ORAL DAILY
Refills: 0 | Status: DISCONTINUED | OUTPATIENT
Start: 2020-05-13 | End: 2020-05-14

## 2020-05-13 RX ORDER — LOSARTAN/HYDROCHLOROTHIAZIDE 100MG-25MG
1 TABLET ORAL
Qty: 0 | Refills: 0 | DISCHARGE

## 2020-05-13 RX ORDER — ATORVASTATIN CALCIUM 80 MG/1
80 TABLET, FILM COATED ORAL AT BEDTIME
Refills: 0 | Status: DISCONTINUED | OUTPATIENT
Start: 2020-05-13 | End: 2020-05-15

## 2020-05-13 RX ORDER — NITROGLYCERIN 6.5 MG
0.4 CAPSULE, EXTENDED RELEASE ORAL
Refills: 0 | Status: DISCONTINUED | OUTPATIENT
Start: 2020-05-13 | End: 2020-05-14

## 2020-05-13 RX ORDER — METOPROLOL TARTRATE 50 MG
1 TABLET ORAL
Qty: 0 | Refills: 0 | DISCHARGE

## 2020-05-13 RX ORDER — HYDROCHLOROTHIAZIDE 25 MG
12.5 TABLET ORAL DAILY
Refills: 0 | Status: DISCONTINUED | OUTPATIENT
Start: 2020-05-13 | End: 2020-05-15

## 2020-05-13 RX ORDER — METOPROLOL TARTRATE 50 MG
50 TABLET ORAL DAILY
Refills: 0 | Status: DISCONTINUED | OUTPATIENT
Start: 2020-05-13 | End: 2020-05-15

## 2020-05-13 RX ORDER — NITROGLYCERIN 6.5 MG
0.4 CAPSULE, EXTENDED RELEASE ORAL
Refills: 0 | Status: DISCONTINUED | OUTPATIENT
Start: 2020-05-13 | End: 2020-05-13

## 2020-05-13 RX ORDER — HEPARIN SODIUM 5000 [USP'U]/ML
6000 INJECTION INTRAVENOUS; SUBCUTANEOUS EVERY 6 HOURS
Refills: 0 | Status: DISCONTINUED | OUTPATIENT
Start: 2020-05-13 | End: 2020-05-14

## 2020-05-13 RX ORDER — ASPIRIN/CALCIUM CARB/MAGNESIUM 324 MG
325 TABLET ORAL ONCE
Refills: 0 | Status: COMPLETED | OUTPATIENT
Start: 2020-05-13 | End: 2020-05-13

## 2020-05-13 RX ORDER — HEPARIN SODIUM 5000 [USP'U]/ML
5000 INJECTION INTRAVENOUS; SUBCUTANEOUS ONCE
Refills: 0 | Status: COMPLETED | OUTPATIENT
Start: 2020-05-13 | End: 2020-05-13

## 2020-05-13 RX ORDER — ASPIRIN/CALCIUM CARB/MAGNESIUM 324 MG
81 TABLET ORAL DAILY
Refills: 0 | Status: DISCONTINUED | OUTPATIENT
Start: 2020-05-13 | End: 2020-05-15

## 2020-05-13 RX ORDER — ENOXAPARIN SODIUM 100 MG/ML
100 INJECTION SUBCUTANEOUS ONCE
Refills: 0 | Status: DISCONTINUED | OUTPATIENT
Start: 2020-05-13 | End: 2020-05-13

## 2020-05-13 RX ORDER — LOSARTAN POTASSIUM 100 MG/1
50 TABLET, FILM COATED ORAL DAILY
Refills: 0 | Status: DISCONTINUED | OUTPATIENT
Start: 2020-05-13 | End: 2020-05-15

## 2020-05-13 RX ORDER — HEPARIN SODIUM 5000 [USP'U]/ML
INJECTION INTRAVENOUS; SUBCUTANEOUS
Qty: 25000 | Refills: 0 | Status: DISCONTINUED | OUTPATIENT
Start: 2020-05-13 | End: 2020-05-14

## 2020-05-13 RX ORDER — SIMVASTATIN 20 MG/1
20 TABLET, FILM COATED ORAL AT BEDTIME
Refills: 0 | Status: DISCONTINUED | OUTPATIENT
Start: 2020-05-13 | End: 2020-05-13

## 2020-05-13 RX ADMIN — ATORVASTATIN CALCIUM 80 MILLIGRAM(S): 80 TABLET, FILM COATED ORAL at 21:17

## 2020-05-13 RX ADMIN — Medication 30 MILLILITER(S): at 19:28

## 2020-05-13 RX ADMIN — Medication 325 MILLIGRAM(S): at 19:28

## 2020-05-13 RX ADMIN — Medication 0.4 MILLIGRAM(S): at 21:19

## 2020-05-13 RX ADMIN — HEPARIN SODIUM 5000 UNIT(S): 5000 INJECTION INTRAVENOUS; SUBCUTANEOUS at 21:01

## 2020-05-13 RX ADMIN — HEPARIN SODIUM 1000 UNIT(S)/HR: 5000 INJECTION INTRAVENOUS; SUBCUTANEOUS at 21:06

## 2020-05-13 RX ADMIN — CLOPIDOGREL BISULFATE 75 MILLIGRAM(S): 75 TABLET, FILM COATED ORAL at 21:17

## 2020-05-13 NOTE — ED PROVIDER NOTE - OBJECTIVE STATEMENT
58 y/o male h/o CAD/HTN  s/p stent placement in 2010 presents c/o substernal chest pressure that started around 3pm while watching TV today. He reports the pain has improved however he still feels some slight chest pressure currently. He denies any associated dizziness, sob, palpitations, diaphoresis, nausea or vomiting.  Denies any radiation of the pain. PT follows with [facility].

## 2020-05-13 NOTE — H&P ADULT - NSHPPHYSICALEXAM_GEN_ALL_CORE
Vital Signs Last 24 Hrs  T(C): 36.8 (13 May 2020 17:40), Max: 36.8 (13 May 2020 17:40)  T(F): 98.3 (13 May 2020 17:40), Max: 98.3 (13 May 2020 17:40)  HR: 55 (13 May 2020 20:26) (55 - 69)  BP: 154/85 (13 May 2020 20:26) (154/85 - 154/90)=  RR: 20 (13 May 2020 20:26) (18 - 20)  SpO2: 97% (13 May 2020 20:26) (97% - 98%)    GENERAL: Patient is a middle-aged [place]-speaking  male found resting comfortably on stretcher in no acute distress, pleasant and cooperative, well-groomed and well-developed.  HEENT: Normocephalic, atraumatic; EOMI, PEERL; moist mucous membranes.  RESPIRATORY: Normal respiratory rate and effort, lungs are clear to auscultation bilaterally, no wheezing, rales or rhonchi.  CARDIAC: Regular rate and rhythm, no murmurs, rubs or gallop. No LE edema, peripheral pulses present and normal, capillary refill < 2 seconds.  GI: Normal bowel sounds, obese abdomen, nontender, nondistended, no rebound or guarding.  : No CVA tenderness, genital exam deferred.  MSK: Normal range of motion, no joint or muscle tenderness. No calf tenderness, Homans sign negative.  NEUROLOGICAL: Patient is Alert and oriented x4, no gross deficits.  PSYCH: Good eye contact, normal speech and affect.  SKIN: No rashes or lesions.

## 2020-05-13 NOTE — H&P ADULT - HISTORY OF PRESENT ILLNESS
Patient is a 59-year-old male with PMHx of Hypertension, Hyperlipidemia, CAD s/p PCI with stent placement on 2010 who complains today of substernal nonradiated pressure-like chest pain 5/10 without aggravating or associated factors and that is present at rest. He states that began this AM as mild discomfort, he decided to measure his BP and it was "170" which prompted him to take additional dose of his daily metoprolol with some relief. Pain recurred later in the afternoon and with more intensity which brought him to the ED. He has so far received x1 dose of 325mg of PO ASA and currently rates his pain as 3/10. No other symptoms.     Patient denies any recent travel but admits that his wife was sick with flu-like symptoms about 5 weeks ago, she did not seek medical attention and her symptoms self resolved.

## 2020-05-13 NOTE — ED ADULT TRIAGE NOTE - CHIEF COMPLAINT QUOTE
Patient with chest pain for approximately 2 hours. substernal, no radiation. denies difficulty breathing, denies sick contacts.  Denies nausea/vomiting. Patient with chest pain for approximately 2 hours. substernal, no radiation. denies difficulty breathing, denies sick contacts.  Denies nausea/vomiting. Cardiac history.

## 2020-05-13 NOTE — ED ADULT NURSE NOTE - OBJECTIVE STATEMENT
Patient reports midsternal chest pain, described as tightness, that started 2 1/2 hours ago  (1630).  Patient denies SOB, Nausea, Vomit.

## 2020-05-13 NOTE — CONSULT NOTE ADULT - SUBJECTIVE AND OBJECTIVE BOX
[facility]                                                    [address], [address], [place] [address]                                                         PHONE: [phone]    FAX: [phone] [address], [address], [place] [address]                                                 PHONE: [phone]    FAX: [phone]  *******************************************************************************    Reason for Consult: Chest pain    HPI:  MARY BETH CHA is a 59y Male with h/o CAD s/p PCI/stent '10, HTN, HL a/w chest pain.  Patient initially developed chest pain this AM associated with elevated BP.  He took an extra dose of his medicine with some relief.  Starting at 3 pm today while watching TV, he developed SSCP/pressure, initially 5/10 now 3/10.  No associated SOB, nausea/vomiting, diaphoresis or radiation.  Pain is consistent with his prior anginal equivalent.  No fever, cough, palpitations, LH, syncope, orthopnea, PND, LE edema.    PAST MEDICAL & SURGICAL HISTORY:  HTN (hypertension)  Stented coronary artery  High cholesterol: UNKNOWN MEDICINE      No Known Allergies      MEDICATIONS  (STANDING):    MEDICATIONS  (PRN):      Social History: no active tobacco / EtOH / IVDA    Family History: MI - brother    ROS: As noted above, all other review of systems were reviewed and are negative.    Vital Signs Last 24 Hrs  T(C): 36.8 (13 May 2020 17:40), Max: 36.8 (13 May 2020 17:40)  T(F): 98.3 (13 May 2020 17:40), Max: 98.3 (13 May 2020 17:40)  HR: 69 (13 May 2020 17:40) (69 - 69)  BP: 154/90 (13 May 2020 17:40) (154/90 - 154/90)  BP(mean): --  RR: 18 (13 May 2020 17:40) (18 - 18)  SpO2: 98% (13 May 2020 17:40) (98% - 98%)    I&O's Detail    I&O's Summary          PHYSICAL EXAM:  General: Appears well developed, well nourished, no acute distress  HEENT: Head: normocephalic, atraumatic  Eyes: Pupils equal and reactive  Neck: Supple, no carotid bruit, no JVD, no HJR  CARDIOVASCULAR: Normal S1 and S2, no murmur, rub, or gallop  LUNGS: Clear to auscultation bilaterally, no rales, rhonchi or wheeze  ABDOMEN: Soft, nontender, non-distended, positive bowel sounds, no mass or bruit  EXTREMITIES: No edema, distal pulses WNL  SKIN: Warm and dry with normal turgor  NEURO: Alert & oriented x 3, grossly intact  PSYCH: normal mood and affect    LABS:                        15.0   7.40  )-----------( 285      ( 13 May 2020 18:36 )             44.9     05-13    137  |  100  |  18.0  ----------------------------<  121<H>  4.0   |  26.0  |  0.97    Ca    9.0      13 May 2020 18:36    TPro  6.7  /  Alb  4.1  /  TBili  0.2<L>  /  DBili  x   /  AST  25  /  ALT  27  /  AlkPhos  79  05-13    CARDIAC MARKERS ( 13 May 2020 18:36 )  x     / 0.12 ng/mL / x     / x     / x          PT/INR - ( 13 May 2020 18:36 )   PT: 10.5 sec;   INR: 0.93 ratio         PTT - ( 13 May 2020 18:36 )  PTT:30.9 sec    RADIOLOGY & ADDITIONAL STUDIES:    ECG: NSR @ 61 bpm, non-specific inferior repolarization abnormality      Assessment and Plan:  In summary, MARY BETH CHA is a 59M a/w chest pain/NSTEMI (1st troponin 0.12), h/o CAD s/p PCI/stent '10, HTN, HL  - Monitor on telemetry  - Check troponins x3  - Repeat EKGs  - Echocardiogram  - Given chest pain/NSTEMI with known severe CAD, will plan on Cardiac Catheterization tomorrow (NPO past midnight).  Risks, benefits & alternatives discussed with the patient and he agrees to proceed.  Nothing to suggest a contrast allergy.  - Rhythm stable, BP increased = continue current chronic doses of Lopressor 25 bid & Lisinopril 20 daily and initiate Nitropaste 1/2" q6h for now and titrate PRN  - Lovenox 1 mg/kg SQ x1 given = will hold off on repeat dosing for now until cath time determined in AM  -  x1 given the 81 mg PO daily  - Continue Plavix 75 mg PO daily  - Lipitor 80 mg PO daily for now (check lipids in AM)  - Will review office records in AM  - Above discussed with medicine team    We will follow with you.  Thank you for allowing me to participate in the care of your patient.      Sincerely,    Gucci Matthews MD

## 2020-05-13 NOTE — ED ADULT NURSE NOTE - CHIEF COMPLAINT QUOTE
Patient with chest pain for approximately 2 hours. substernal, no radiation. denies difficulty breathing, denies sick contacts.  Denies nausea/vomiting. Cardiac history.

## 2020-05-13 NOTE — H&P ADULT - ASSESSMENT
Patient is a 59-year-old male with PMHx of Hypertension, Hyperlipidemia, CAD s/p PCI with stent placement on 2010 now presenting with ACS, currently hemodynamically stable and afebrile.    Admit to Medicine service.  Telemetry bed.  NPO after midnight  Bed rest    NSTEMI  ·	Chest pain at rest.  ·	Patient with multiple risk factors (HTN, HLD, FHx of CAD), known CAD, on DAPT prior to presentation, and initial troponin of 0.12  ·	EWA score: 4  ·	No acute changes on EKG. No signs or symptoms of acute HF.  ·	Will admit for cardiac cath in the AM  ·	Cardio recommendations appreciated.  ·	S/p x1 dose of 325 mg ASA, will continue DAPT.  ·	Normal heart rate, will continue home dose of BB.  ·	No hypotension, will continue home ARB and HCTZ.  ·	Patient did not receive Lovenox in the ED, will order heparin gtt given that cardiac cath is expected within the next 48 hours.   ·	NTG with holding parameters for chest pain.  ·	Will order statin  ·	F/u serial EKGs and troponins.    Hypertension  ·	DASH/TLC diet when resuming PO intake.  ·	Will continue home medications.    Hyperlipidemia  ·	F/u AM lipid panel  ·	Will escalate therapy to atorvastatin 80 mg PO daily    Prophylactic measure  Patient will be placed on AC for NSTEMI. Patient is a 59-year-old male with PMHx of Hypertension, Hyperlipidemia, CAD s/p PCI with stent placement on 2010 now presenting with ACS, currently hemodynamically stable and afebrile.    Admit to Medicine service.  Telemetry bed.  NPO after midnight  Bed rest    NSTEMI  ·	Chest pain at rest.  ·	Patient with multiple risk factors (HTN, HLD, FHx of CAD), known CAD, on DAPT prior to presentation, and initial troponin of 0.12  ·	EWA score: 4  ·	No acute changes on EKG. No signs or symptoms of acute HF.  ·	Will admit for cardiac cath in the AM  ·	Cardio following   ·	S/p x1 dose of 325 mg ASA, will continue DAPT.  ·	Normal heart rate, will continue home dose of BB.  ·	No hypotension, will continue home ARB and HCTZ.  ·	Patient did not receive Lovenox in the ED, will order heparin gtt given that cardiac cath is expected within the next 48 hours.   ·	NTG with holding parameters for chest pain.  ·	Will order statin  ·	F/u serial EKGs and troponins.    Hypertension  ·	DASH/TLC diet when resuming PO intake.  ·	Will continue home medications.    Hyperlipidemia  ·	F/u AM lipid panel  ·	Will escalate therapy to atorvastatin 80 mg PO daily    Prophylactic measure  Patient will be placed on AC for NSTEMI.

## 2020-05-13 NOTE — ED STATDOCS - PROGRESS NOTE DETAILS
58 y/o M pt with significant PMHx of HLD, HTN, and a stented coronary artery presents to the ED c/o CP without radiation that began approximately 2 hours ago. It began when he was watching TV. He was here a year ago and has one stent placed. His cardiologist is part of [facility]. Denies f/c/n/v/sob/palpitations/ cough/rash/headache/dizziness/abd.pain/d/c/dysuria/hematuria. Pt further denies sick contacts. He took aspirin today. No further complaints at this time. Will send to [facility] for evaluation by another provider.

## 2020-05-13 NOTE — H&P ADULT - NSICDXFAMILYHX_GEN_ALL_CORE_FT
FAMILY HISTORY:  Mother  Still living? Unknown  Family history of coronary artery disease, Age at diagnosis: Age Unknown  Family history of hyperlipidemia, Age at diagnosis: Age Unknown    Sibling  Still living? Unknown  Family history of coronary artery disease, Age at diagnosis: Age Unknown  Family history of hyperlipidemia, Age at diagnosis: Age Unknown

## 2020-05-13 NOTE — ED PROVIDER NOTE - CHPI ED SYMPTOMS NEG
no vomiting/no shortness of breath/no back pain/no fever/no nausea/no cough/no chills/no diaphoresis

## 2020-05-13 NOTE — H&P ADULT - NSHPLABSRESULTS_GEN_ALL_CORE
15.0   7.40  )-----------( 285      ( 13 May 2020 18:36 )             44.9     05-13    137  |  100  |  18.0  ----------------------------<  121<H>  4.0   |  26.0  |  0.97    Ca    9.0      13 May 2020 18:36    TPro  6.7  /  Alb  4.1  /  TBili  0.2<L>  /  DBili  x   /  AST  25  /  ALT  27  /  AlkPhos  79  05-13    PT/INR - ( 13 May 2020 18:36 )   PT: 10.5 sec;   INR: 0.93 ratio    PTT - ( 13 May 2020 18:36 )  PTT:30.9 sec    Troponin T, Serum: 0.12 ng/mL (05.13.20 @ 18:36)    EKG: Normal sinus rhythm, no blocks, no ST elevation or depression    Chest X-Ray: Unremarkable.

## 2020-05-13 NOTE — H&P ADULT - NSHPREVIEWOFSYSTEMS_GEN_ALL_CORE
No palpitations, shortness of breath, lightheadedness, dizziness, nausea/vomiting, diaphoresis, cold extremities, diarrhea, constipation, abdominal pain, back pain, fever, dysuria, LE edema or other symptoms.

## 2020-05-13 NOTE — ED PROVIDER NOTE - CLINICAL SUMMARY MEDICAL DECISION MAKING FREE TEXT BOX
58 y/o male h/o CAD s/p stent 2010 with chest pressure and elevated trop  Discussed with Dr. Matthews   will start lovenox and admit

## 2020-05-13 NOTE — ED PROVIDER NOTE - ATTENDING CONTRIBUTION TO CARE
Vandana: I performed a face to face bedside interview with patient regarding history of present illness, review of symptoms and past medical history. I completed an independent physical exam.  I have discussed patient's plan of care with advanced care provider.   I agree with note as stated above including HISTORY OF PRESENT ILLNESS, HIV, PAST MEDICAL/SURGICAL/FAMILY/SOCIAL HISTORY, ALLERGIES AND HOME MEDICATIONS, REVIEW OF SYSTEMS, PHYSICAL EXAM, MEDICAL DECISION MAKING and any PROGRESS NOTES during the time I functioned as the attending physician for this patient  unless otherwise noted. My brief assessment is as follows: 59M h/o CAD s/p stent p/w non-exertional non-radiating constant improving CP since 1500. Denies fever, cough, SOB, LE edema, calf pain, nausea, vomiting.   Gen: Well appearing in NAD  Head: NC/AT  Neck: trachea midline  Resp:  No distress, CTAB  CV: RRR  Ext: no deformities  Neuro:  A&O appears non focal  Skin:  Warm and dry as visualized  Psych:  Normal affect and mood  HEART score 4, trop positive. [facility] consulted. CXR WNL. Pt given ASA and lovenox. Admitted for NSTEMI

## 2020-05-13 NOTE — ED STATDOCS - PMH
Report given to koehler   High cholesterol  UNKNOWN MEDICINE  HTN (hypertension)    Stented coronary artery

## 2020-05-14 ENCOUNTER — TRANSCRIPTION ENCOUNTER (OUTPATIENT)
Age: 60
End: 2020-05-14

## 2020-05-14 LAB
A1C WITH ESTIMATED AVERAGE GLUCOSE RESULT: 6.2 % — HIGH (ref 4–5.6)
ALBUMIN SERPL ELPH-MCNC: 4.1 G/DL — SIGNIFICANT CHANGE UP (ref 3.3–5.2)
ALP SERPL-CCNC: 74 U/L — SIGNIFICANT CHANGE UP (ref 40–120)
ALT FLD-CCNC: 29 U/L — SIGNIFICANT CHANGE UP
ANION GAP SERPL CALC-SCNC: 12 MMOL/L — SIGNIFICANT CHANGE UP (ref 5–17)
APTT BLD: 45.3 SEC — HIGH (ref 27.5–36.3)
APTT BLD: 62.8 SEC — HIGH (ref 27.5–36.3)
AST SERPL-CCNC: 40 U/L — HIGH
BASOPHILS # BLD AUTO: 0.03 K/UL — SIGNIFICANT CHANGE UP (ref 0–0.2)
BASOPHILS NFR BLD AUTO: 0.4 % — SIGNIFICANT CHANGE UP (ref 0–2)
BILIRUB SERPL-MCNC: 0.4 MG/DL — SIGNIFICANT CHANGE UP (ref 0.4–2)
BUN SERPL-MCNC: 16 MG/DL — SIGNIFICANT CHANGE UP (ref 8–20)
CALCIUM SERPL-MCNC: 9.1 MG/DL — SIGNIFICANT CHANGE UP (ref 8.6–10.2)
CHLORIDE SERPL-SCNC: 100 MMOL/L — SIGNIFICANT CHANGE UP (ref 98–107)
CHOLEST SERPL-MCNC: 207 MG/DL — HIGH (ref 110–199)
CK MB CFR SERPL CALC: 30.2 NG/ML — HIGH (ref 0–6.7)
CK MB CFR SERPL CALC: 37.8 NG/ML — HIGH (ref 0–6.7)
CK SERPL-CCNC: 521 U/L — HIGH (ref 30–200)
CK SERPL-CCNC: 624 U/L — HIGH (ref 30–200)
CO2 SERPL-SCNC: 26 MMOL/L — SIGNIFICANT CHANGE UP (ref 22–29)
CREAT SERPL-MCNC: 0.88 MG/DL — SIGNIFICANT CHANGE UP (ref 0.5–1.3)
EOSINOPHIL # BLD AUTO: 0.15 K/UL — SIGNIFICANT CHANGE UP (ref 0–0.5)
EOSINOPHIL NFR BLD AUTO: 1.9 % — SIGNIFICANT CHANGE UP (ref 0–6)
ESTIMATED AVERAGE GLUCOSE: 131 MG/DL — HIGH (ref 68–114)
GLUCOSE SERPL-MCNC: 92 MG/DL — SIGNIFICANT CHANGE UP (ref 70–99)
HCT VFR BLD CALC: 43.2 % — SIGNIFICANT CHANGE UP (ref 39–50)
HCT VFR BLD CALC: 46.6 % — SIGNIFICANT CHANGE UP (ref 39–50)
HDLC SERPL-MCNC: 38 MG/DL — LOW
HGB BLD-MCNC: 14.4 G/DL — SIGNIFICANT CHANGE UP (ref 13–17)
HGB BLD-MCNC: 15 G/DL — SIGNIFICANT CHANGE UP (ref 13–17)
IMM GRANULOCYTES NFR BLD AUTO: 0.2 % — SIGNIFICANT CHANGE UP (ref 0–1.5)
LIPID PNL WITH DIRECT LDL SERPL: 136 MG/DL — SIGNIFICANT CHANGE UP
LYMPHOCYTES # BLD AUTO: 2.64 K/UL — SIGNIFICANT CHANGE UP (ref 1–3.3)
LYMPHOCYTES # BLD AUTO: 32.7 % — SIGNIFICANT CHANGE UP (ref 13–44)
MAGNESIUM SERPL-MCNC: 2.3 MG/DL — SIGNIFICANT CHANGE UP (ref 1.8–2.6)
MCHC RBC-ENTMCNC: 32 PG — SIGNIFICANT CHANGE UP (ref 27–34)
MCHC RBC-ENTMCNC: 32.2 GM/DL — SIGNIFICANT CHANGE UP (ref 32–36)
MCHC RBC-ENTMCNC: 32.4 PG — SIGNIFICANT CHANGE UP (ref 27–34)
MCHC RBC-ENTMCNC: 33.3 GM/DL — SIGNIFICANT CHANGE UP (ref 32–36)
MCV RBC AUTO: 97.3 FL — SIGNIFICANT CHANGE UP (ref 80–100)
MCV RBC AUTO: 99.4 FL — SIGNIFICANT CHANGE UP (ref 80–100)
MONOCYTES # BLD AUTO: 0.85 K/UL — SIGNIFICANT CHANGE UP (ref 0–0.9)
MONOCYTES NFR BLD AUTO: 10.5 % — SIGNIFICANT CHANGE UP (ref 2–14)
NEUTROPHILS # BLD AUTO: 4.39 K/UL — SIGNIFICANT CHANGE UP (ref 1.8–7.4)
NEUTROPHILS NFR BLD AUTO: 54.3 % — SIGNIFICANT CHANGE UP (ref 43–77)
PLATELET # BLD AUTO: 264 K/UL — SIGNIFICANT CHANGE UP (ref 150–400)
PLATELET # BLD AUTO: 271 K/UL — SIGNIFICANT CHANGE UP (ref 150–400)
POTASSIUM SERPL-MCNC: 4.2 MMOL/L — SIGNIFICANT CHANGE UP (ref 3.5–5.3)
POTASSIUM SERPL-SCNC: 4.2 MMOL/L — SIGNIFICANT CHANGE UP (ref 3.5–5.3)
PROT SERPL-MCNC: 6.7 G/DL — SIGNIFICANT CHANGE UP (ref 6.6–8.7)
RBC # BLD: 4.44 M/UL — SIGNIFICANT CHANGE UP (ref 4.2–5.8)
RBC # BLD: 4.69 M/UL — SIGNIFICANT CHANGE UP (ref 4.2–5.8)
RBC # FLD: 11.9 % — SIGNIFICANT CHANGE UP (ref 10.3–14.5)
RBC # FLD: 12.1 % — SIGNIFICANT CHANGE UP (ref 10.3–14.5)
SODIUM SERPL-SCNC: 138 MMOL/L — SIGNIFICANT CHANGE UP (ref 135–145)
TOTAL CHOLESTEROL/HDL RATIO MEASUREMENT: 5 RATIO — SIGNIFICANT CHANGE UP (ref 3.4–9.6)
TRIGL SERPL-MCNC: 165 MG/DL — SIGNIFICANT CHANGE UP (ref 10–200)
TROPONIN T SERPL-MCNC: 0.2 NG/ML — HIGH (ref 0–0.06)
TROPONIN T SERPL-MCNC: 0.37 NG/ML — HIGH (ref 0–0.06)
WBC # BLD: 8.08 K/UL — SIGNIFICANT CHANGE UP (ref 3.8–10.5)
WBC # BLD: 8.19 K/UL — SIGNIFICANT CHANGE UP (ref 3.8–10.5)
WBC # FLD AUTO: 8.08 K/UL — SIGNIFICANT CHANGE UP (ref 3.8–10.5)
WBC # FLD AUTO: 8.19 K/UL — SIGNIFICANT CHANGE UP (ref 3.8–10.5)

## 2020-05-14 PROCEDURE — 93010 ELECTROCARDIOGRAM REPORT: CPT | Mod: 76

## 2020-05-14 PROCEDURE — 99232 SBSQ HOSP IP/OBS MODERATE 35: CPT

## 2020-05-14 RX ORDER — HEPARIN SODIUM 5000 [USP'U]/ML
5000 INJECTION INTRAVENOUS; SUBCUTANEOUS EVERY 12 HOURS
Refills: 0 | Status: DISCONTINUED | OUTPATIENT
Start: 2020-05-15 | End: 2020-05-15

## 2020-05-14 RX ORDER — TICAGRELOR 90 MG/1
180 TABLET ORAL ONCE
Refills: 0 | Status: COMPLETED | OUTPATIENT
Start: 2020-05-15 | End: 2020-05-15

## 2020-05-14 RX ORDER — TICAGRELOR 90 MG/1
90 TABLET ORAL EVERY 12 HOURS
Refills: 0 | Status: DISCONTINUED | OUTPATIENT
Start: 2020-05-15 | End: 2020-05-15

## 2020-05-14 RX ADMIN — ATORVASTATIN CALCIUM 80 MILLIGRAM(S): 80 TABLET, FILM COATED ORAL at 21:32

## 2020-05-14 RX ADMIN — CLOPIDOGREL BISULFATE 75 MILLIGRAM(S): 75 TABLET, FILM COATED ORAL at 08:26

## 2020-05-14 RX ADMIN — Medication 0.4 MILLIGRAM(S): at 04:56

## 2020-05-14 RX ADMIN — LOSARTAN POTASSIUM 50 MILLIGRAM(S): 100 TABLET, FILM COATED ORAL at 06:14

## 2020-05-14 RX ADMIN — Medication 12.5 MILLIGRAM(S): at 06:14

## 2020-05-14 RX ADMIN — HEPARIN SODIUM 1000 UNIT(S)/HR: 5000 INJECTION INTRAVENOUS; SUBCUTANEOUS at 03:53

## 2020-05-14 RX ADMIN — Medication 81 MILLIGRAM(S): at 08:26

## 2020-05-14 NOTE — DISCHARGE NOTE PROVIDER - NSDCACTIVITY_GEN_ALL_CORE
Stairs allowed/Showering allowed/No heavy lifting/straining/Walking - Outdoors allowed/Walking - Indoors allowed Stairs allowed/Walking - Indoors allowed/No restrictions/Showering allowed/No heavy lifting/straining/Walking - Outdoors allowed

## 2020-05-14 NOTE — PROGRESS NOTE ADULT - SUBJECTIVE AND OBJECTIVE BOX
[facility]                                          [address], [address], [place] [address]                                               PHONE: [phone]    FAX: [phone] [address], [address], [place] [address]                                       PHONE: [phone]    FAX: [phone]  *******************************************************************************    Overnight events/Subjective Assessment:  No further CP reported.  No SOB or palp.  Awaiting cath this AM    INTERPRETATION OF TELEMETRY (personally reviewed): SR/SB, no ectopy    No Known Allergies    MEDICATIONS  (STANDING):  aspirin enteric coated 81 milliGRAM(s) Oral daily  atorvastatin 80 milliGRAM(s) Oral at bedtime  clopidogrel Tablet 75 milliGRAM(s) Oral daily  heparin  Infusion.  Unit(s)/Hr (10 mL/Hr) IV Continuous <Continuous>  hydrochlorothiazide 12.5 milliGRAM(s) Oral daily  losartan 50 milliGRAM(s) Oral daily  metoprolol succinate ER 50 milliGRAM(s) Oral daily  nitroglycerin     SubLingual 0.4 milliGRAM(s) SubLingual <User Schedule>    MEDICATIONS  (PRN):  heparin   Injectable 6000 Unit(s) IV Push every 6 hours PRN For aPTT less than 40      Vital Signs Last 24 Hrs  T(C): 36.8 (14 May 2020 09:30), Max: 36.8 (13 May 2020 17:40)  T(F): 98.3 (14 May 2020 09:30), Max: 98.3 (13 May 2020 17:40)  HR: 87 (14 May 2020 09:30) (54 - 87)  BP: 149/83 (14 May 2020 09:30) (111/59 - 154/90)  BP(mean): --  RR: 16 (14 May 2020 09:30) (16 - 20)  SpO2: 98% (14 May 2020 09:30) (96% - 99%)    I&O's Detail    14 May 2020 07:01  -  14 May 2020 10:09  --------------------------------------------------------  IN:    heparin  Infusion.: 10 mL  Total IN: 10 mL    OUT:  Total OUT: 0 mL    Total NET: 10 mL        I&O's Summary    14 May 2020 07:01  -  14 May 2020 10:09  --------------------------------------------------------  IN: 10 mL / OUT: 0 mL / NET: 10 mL            PHYSICAL EXAM:  General: Appears well developed, well nourished, no acute distress  HEENT: Head: normocephalic, atraumatic  Eyes: Pupils equal and reactive  Neck: Supple, no carotid bruit, no JVD, no HJR  CARDIOVASCULAR: Normal S1 and S2, no murmur, rub, or gallop  LUNGS: Clear to auscultation bilaterally, no rales, rhonchi or wheeze  ABDOMEN: Soft, nontender, non-distended, positive bowel sounds, no mass or bruit  EXTREMITIES: No edema, distal pulses WNL  SKIN: Warm and dry with normal turgor  NEURO: Alert & oriented x 3, grossly intact  PSYCH: normal mood and affect        LABS:                        15.0   8.08  )-----------( 271      ( 14 May 2020 06:25 )             46.6     05-14    138  |  100  |  16.0  ----------------------------<  92  4.2   |  26.0  |  0.88    Ca    9.1      14 May 2020 06:25  Mg     2.3     05-14    TPro  6.7  /  Alb  4.1  /  TBili  0.4  /  DBili  x   /  AST  40<H>  /  ALT  29  /  AlkPhos  74  05-14    CARDIAC MARKERS ( 14 May 2020 06:25 )  x     / 0.37 ng/mL / 624 U/L / x     / 37.8 ng/mL  CARDIAC MARKERS ( 14 May 2020 00:15 )  x     / 0.20 ng/mL / 521 U/L / x     / 30.2 ng/mL  CARDIAC MARKERS ( 13 May 2020 18:36 )  x     / 0.12 ng/mL / x     / x     / x          PT/INR - ( 13 May 2020 18:36 )   PT: 10.5 sec;   INR: 0.93 ratio         PTT - ( 14 May 2020 02:51 )  PTT:62.8 sec  serum  Lipids:       ASSESSMENT AND PLAN:  MARY BETH CHA is a 59M a/w chest pain/NSTEMI, h/o CAD s/p PCI/stent '10, HTN, HLD.      - No events on telemetry  - Troponin increasing to 0.37 with elevated CK and CKMB.  Consistent with NSTEMI.  - Given chest pain/NSTEMI with known severe CAD, will plan on Cardiac Catheterization today.  Risks, benefits & alternatives discussed with the patient and he agrees to proceed.  Nothing to suggest a contrast allergy.  - Continue current chronic doses of Lopressor 25 bid & Lisinopril 20 daily   - Heparin drip in place.  Hold on call to cath lab.  -  x1 given.  Continue 81 mg PO daily  - Continue Plavix 75 mg PO daily  - Lipitor 80 mg PO daily for now  - Further recommendations pending cardiac cath  - Likely discharge today/tomorrow.        Curry Cardozo MD

## 2020-05-14 NOTE — DISCHARGE NOTE PROVIDER - NSDCFUADDAPPT_GEN_ALL_CORE_FT
Follow up with Dr. Cardozo in one to two weeks    Restricted use with no heavy lifting of affected arm for 48 hours.  No submerging the arm in water for 48 hours.  You may start showering today.  Call your doctor for any bleeding, swelling, loss of sensation in the hand or fingers, or fingers turning blue.  If heavy bleeding or large lumps form, hold pressure at the spot and come to the Emergency Room.    Choose lean meats and poultry without skin and prepare them without added saturated and trans fat.  Eat fish at least twice a week. Recent research shows that eating oily fish containing omega-3 fatty acids (for example, salmon, trout and herring) may help lower your risk of death from coronary artery disease.  Select fat-free, 1 percent fat and low-fat dairy products.  Cut back on foods containing partially hydrogenated vegetable oils to reduce trans fat in your diet.   To lower cholesterol, reduce saturated fat to no more than 5 to 6 percent of total calories. For someone eating 2,000 calories a day, that’s about 13 grams of saturated fat.  Cut back on beverages and foods with added sugars.  Choose and prepare foods with little or no salt. To lower blood pressure, aim to eat no more than 2,400 milligrams of sodium per day. Reducing daily intake to 1,500 mg is desirable because it can lower blood pressure even further.  If you drink alcohol, drink in moderation. That means one drink per day if you’re a woman and two drinks  per day if you’re a man.  Follow the American Heart Association recommendations when you eat out, and keep an eye on your portion sizes. Follow up with Dr. Tellez in one week    Restricted use with no heavy lifting of affected arm for 48 hours.  No submerging the arm in water for 48 hours.  You may start showering today.  Call your doctor for any bleeding, swelling, loss of sensation in the hand or fingers, or fingers turning blue.  If heavy bleeding or large lumps form, hold pressure at the spot and come to the Emergency Room.    Choose lean meats and poultry without skin and prepare them without added saturated and trans fat.  Eat fish at least twice a week. Recent research shows that eating oily fish containing omega-3 fatty acids (for example, salmon, trout and herring) may help lower your risk of death from coronary artery disease.  Select fat-free, 1 percent fat and low-fat dairy products.  Cut back on foods containing partially hydrogenated vegetable oils to reduce trans fat in your diet.   To lower cholesterol, reduce saturated fat to no more than 5 to 6 percent of total calories. For someone eating 2,000 calories a day, that’s about 13 grams of saturated fat.  Cut back on beverages and foods with added sugars.  Choose and prepare foods with little or no salt. To lower blood pressure, aim to eat no more than 2,400 milligrams of sodium per day. Reducing daily intake to 1,500 mg is desirable because it can lower blood pressure even further.  If you drink alcohol, drink in moderation. That means one drink per day if you’re a woman and two drinks  per day if you’re a man.  Follow the American Heart Association recommendations when you eat out, and keep an eye on your portion sizes.

## 2020-05-14 NOTE — PROGRESS NOTE ADULT - ASSESSMENT
A/P: MARY BETH CHA is a 59M a/w chest pain/NSTEMI, h/o CAD s/p PCI/stent '10, HTN, HLD now s/p LHC that revealed 2 vessel CAD.  The previously placed OM1 stent (from 2010) is chronically occluded. A/P: MARY BETH CHA is a 59M a/w chest pain/NSTEMI, h/o CAD s/p PCI/stent '10, HTN, HLD now s/p C that revealed 2 vessel CAD.  The previously placed OM1 stent (from 2010) is chronically occluded.  The distal vessel fills via collaterals.  Now Successful PCI of the proximal LCx with a 4.0mm Resolute TANIA, Successful PCI of the OM2 with a 3.0mm Resolute TANIA (culprit) and Successful PCI of the ulcerated mid RCA with a 2.5mm Resolute TANIA (culprit).  Overall normal LV systolic function with RWMA.  -Maintain tele bed status  -Bedrest x 2 hours post procedure then OOB  -Remove radial band 2 hours post procedure at 1400  -If stable, plan for discharge to home in the am from a cardiac perspective  -Load Brilinta 180mg x 1 in am then start Brilinta 90mg PO Q12 hours   -Discontinue Plavix as of tomorrow  -ASA 81 mg daily indefinitely  -High intensity statin  -Maintain beta blocker and Arb s/p NSTEMI  -TTE as outpatient  -Follow up with Dr. Craig at [facility] in 1 week  -Benefits of ASA/Brilinta discussed with patient verbal understanding via translation  -Lifestyle mods/diet/activity/meds discussed with patient verbal understanding  -Discussed with Dr. Cardozo A/P: MARY BETH CHA is a 59M a/w chest pain/NSTEMI, h/o CAD s/p PCI/stent '10, HTN, HLD now s/p C that revealed 2 vessel CAD.  The previously placed OM1 stent (from 2010) is chronically occluded.  The distal vessel fills via collaterals.  Now Successful PCI of the proximal LCx with a 4.0mm Resolute TANIA, Successful PCI of the OM2 with a 3.0mm Resolute TANIA (culprit) and Successful PCI of the ulcerated mid RCA with a 2.5mm Resolute TANIA (culprit).  Overall normal LV systolic function with RWMA.  -Maintain tele bed status  -Bedrest x 2 hours post procedure then OOB  -Remove radial band 2 hours post procedure at 1400  -If stable, plan for discharge to home in the am from a cardiac perspective  -Discontinue Heparin infusion  -Load Brilinta 180mg x 1 in am then start Brilinta 90mg PO Q12 hours   -Discontinue Plavix as of tomorrow  -ASA 81 mg daily indefinitely  -High intensity statin  -Maintain beta blocker and Arb s/p NSTEMI  -TTE as outpatient  -Follow up with Dr. Craig at [facility] in 1 week  -Benefits of ASA/Brilinta discussed with patient verbal understanding via translation  -Lifestyle mods/diet/activity/meds discussed with patient verbal understanding  -Discussed with Dr. Cardozo

## 2020-05-14 NOTE — PROGRESS NOTE ADULT - SUBJECTIVE AND OBJECTIVE BOX
Cardiology NP post procedure note:     -s/p PCI/TANIA via RRA by Dr. Cardozo  < from: Cardiac Cath Lab - Adult (20 @ 10:16) >  - Successful PCI of the proximal LCx with a 4.0mm Resolute TANIA  - Successful PCI of the OM2 with a 3.0mm Resolute TANIA (culprit)  - Successful PCI of the ulcerated mid RCA with a 2.5mm Resolute TANIA   (culprit)  - Overall normal LV systolic function with RWMA    < end of copied text >    EKG post PCI: NSR 83 bpm without ST elevations/depressions; inferior Q waves; unchanged from prior  TELE: NSR 80s    MEDICATIONS  (STANDING):  aspirin enteric coated 81 milliGRAM(s) Oral daily  atorvastatin 80 milliGRAM(s) Oral at bedtime  clopidogrel Tablet 75 milliGRAM(s) Oral daily  hydrochlorothiazide 12.5 milliGRAM(s) Oral daily  losartan 50 milliGRAM(s) Oral daily  metoprolol succinate ER 50 milliGRAM(s) Oral daily  nitroglycerin     SubLingual 0.4 milliGRAM(s) SubLingual <User Schedule>    Allergies:  No Known Allergies    PAST MEDICAL & SURGICAL HISTORY:  Coronary artery disease  Hypercholesterolemia  Essential hypertension  S/P coronary artery stent placement      Vital Signs Last 24 Hrs  T(C): 36.8 (14 May 2020 09:30), Max: 36.8 (13 May 2020 17:40)  T(F): 98.3 (14 May 2020 09:30), Max: 98.3 (13 May 2020 17:40)  HR: 82 (14 May 2020 12:00) (54 - 87)  BP: 134/77 (14 May 2020 12:00) (111/59 - 154/90)  BP(mean): --  RR: 19 (14 May 2020 12:00) (16 - 20)  SpO2: 98% (14 May 2020 12:00) (96% - 99%)    Physical Exam:  Constitutional: NAD, AAOx3  Cardiovascular: +S1S2 RRR  Pulmonary: CTA b/l, unlabored  GI: soft NTND +BS  Extremities: no pedal edema, +distal pulses b/l  Neuro: non focal, ESCOBAR x4  Procedure site: Right radial band in place; site benign without hematoma/bleeding; RUE warm/mobile/acyanotic; + right ulnar pulse    LABS:                        15.0   8.08  )-----------( 271      ( 14 May 2020 06:25 )             46.6         138  |  100  |  16.0  ----------------------------<  92  4.2   |  26.0  |  0.88    Ca    9.1      14 May 2020 06:25  Mg     2.3         TPro  6.7  /  Alb  4.1  /  TBili  0.4  /  DBili  x   /  AST  40<H>  /  ALT  29  /  AlkPhos  74      PT/INR - ( 13 May 2020 18:36 )   PT: 10.5 sec;   INR: 0.93 ratio         PTT - ( 14 May 2020 10:18 )  PTT:45.3 sec      RADIOLOGY & ADDITIONAL TESTS:  < from: Cardiac Cath Lab - Adult (20 @ 10:16) >  VENTRICLES: Analysis of regional contractile function demonstrated severe  posterobasal hypokinesis. Global left ventricular function was normal. EF  estimated was 60 %.  CORONARY VESSELS: The coronary circulation is right dominant.  LM:   --  LM: The left anterior descending and circumflex arteries arose  anomalously from separate ostia.  LAD:   --  LAD: Angiography showed mild atherosclerosis with no flow  limiting lesions.  CX:   --  Proximal circumflex: There was a discrete 70 % stenosis. The  lesion was irregularly contoured and eccentric. There was EWA grade 3  flow through the vessel (brisk flow). An interventionwas performed.  --  OM1: There was a 100 % stenosis at the site of a prior stent. The  distal vessel fills via collaterals. It appears to be chronically  occluded.  --  OM2: There was a discrete 90 % stenosis. The lesion was irregularly  contoured, hazy, and eccentric. There was EWA grade 3 flow through the  vessel (brisk flow). This lesion is a likely culprit for the patient's  recent myocardial infarction. An intervention was performed.  RCA:   --  Mid RCA: There was a tubular 70 % stenosis. The lesion was  irregularly contoured, ulcerated, and complex. There was EWA grade 3 flow  through the vessel (brisk flow). This lesion is a likely culprit for the  patient's recent myocardial infarction. An intervention was performed.  COMPLICATIONS:No complications occurred during the cath lab visit.  DIAGNOSTIC IMPRESSIONS: - Severe 2 vessel CAD  - The previously placed OM1 stent (from ) is chronically occluded. The   distal vessel fills via collaterals  - Successful PCI of the proximal LCx with a 4.0mm Resolute TANIA  - Successful PCI of the OM2 with a 3.0mm Resolute TANIA (culprit)  - Successful PCI of the ulcerated mid RCA with a 2.5mm Resolute TANIA   (culprit)  - Overall normal LV systolic function with RWMA  DIAGNOSTIC RECOMMENDATIONS:- ASA 81mg daily indefinitely; loaded with  Plavix on the table. Will discharge on Brilinta 90mg BID  - Aggressive medical management and risk factor modification  - High intensity statin therapy  - BB and ACE  - Echo can be completed as outpatient  - Monitor overnight, discharge in AM if stable  - Outpatient follow up with Dr. Tellez at the [facility] in 1   week  INTERVENTIONAL IMPRESSIONS: - Severe 2 vessel CAD  - The previously placed OM1 stent (from ) is chronically occluded. The   distal vessel fills via collaterals  - Successful PCI of the proximal LCx with a 4.0mm Resolute TANIA  - Successful PCI of the OM2 with a 3.0mm Resolute TANIA (culprit)  - Successful PCI of the ulcerated mid RCA with a 2.5mm Resolute TANIA   (culprit)  - Overall normal LV systolic function with RWMA  INTERVENTIONAL RECOMMENDATIONS: - ASA 81mg daily indefinitely; loaded with  Plavix on the table. Will discharge on Brilinta 90mg BID  - Aggressive medical management and risk factor modification  - High intensity statin therapy  - BB and ACE  - Echo can be completed as outpatient  - Monitor overnight, discharge in AM if stable  - Outpatient follow up with Dr. Tellez at the [facility] in 1   week  Prepared and signed by  Curry Cardozo MD  Signed 2020 12:05:50  HEMODYNAMIC TABLES  Pressures:  NO PHASE  Pressures:  - HR: 68  Pressures:  - Rhythm:  Pressures:  -- Aortic Pressure (S/D/M): 122/83/100  Pressures:  -- Left Ventricle (s/edp): 120/4/--  Outputs:  NO PHASE  Outputs:  -- CALCULATIONS[age]  Outputs:  -- CALCULATIONS: Body Surface Area: 2.11  Outputs:  -- CALCULATIONS: Height in cm: 168.00  Outputs:  -- CALCULATIONS: Sex: Male  Outputs:  -- CALCULATIONS: Weight in k.50  Outputs:  -- OUTPUTS: O2 consumption: 263.74  Outputs:  -- OUTPUTS: Vo2 Indexed: 125.00    < end of copied text >

## 2020-05-14 NOTE — PROGRESS NOTE ADULT - ASSESSMENT
Patient is a 59-year-old male with PMHx of Hypertension, Hyperlipidemia, CAD s/p PCI with stent placement on 2010 now presenting with ACS. Patient s/p cardiac cath fou    NSTEMI s/p PCI x 3 TANIA  S/p x1 dose of 325 mg ASA  d/c plavix, c/w ASA  start brilinta; d/c on brilinta 90mg BID  c/w metoprolol, statin high intensity  c/w ARB  monitor overnight; plan to d/c home tomorrow if stable  Cardiology consulted    Hypertension  DASH/TLC diet when resuming PO intake.  c/w HCTZ and ARB    Hyperlipidemia  c/w statin    Prophylactic measure: VCD; will start heparin in AM    Disposition: home in 24 hours    Advance care directive: full code. d/w son Jerzy ([phone]) and updated plan of care. Patient is a 59-year-old male with PMHx of Hypertension, Hyperlipidemia, CAD s/p PCI with stent placement on 2010 now presenting with ACS. Patient s/p cardiac cath s/p PCI x 3 TANIA. Patient started on brilinta, medical management. Plan for discharge in AM, if medically stable. Cardiology consulted and following.    NSTEMI s/p PCI x 3 TANIA  S/p x1 dose of 325 mg ASA  d/c plavix, c/w ASA  start brilinta; d/c on brilinta 90mg BID  c/w metoprolol, statin high intensity  c/w ARB  monitor overnight; plan to d/c home tomorrow if stable  Cardiology consulted    Hypertension  DASH/TLC diet when resuming PO intake.  c/w HCTZ and ARB    Hyperlipidemia  c/w statin    Prophylactic measure: VCD; will start heparin in AM    Disposition: home in 24 hours    Advance care directive: full code. d/w son Jerzy ([phone]) and updated plan of care.

## 2020-05-14 NOTE — PROGRESS NOTE ADULT - ASSESSMENT
A/P: MARY BETH CHA is a 59M a/w chest pain/NSTEMI three positive troponins, h/o CAD s/p PCI/stent '10, HTN, HL.  Given chest pain/NSTEMI with known severe CAD, for Cardiac Catheterization today with Dr. Cardozo. Risks, benefits & alternatives discussed with the patient and he agrees to proceed.  Nothing to suggest a contrast allergy.  -NPO for procedure  -On ASA/Plavix

## 2020-05-14 NOTE — PROGRESS NOTE ADULT - SUBJECTIVE AND OBJECTIVE BOX
Cardiology NP preprocedure note:    -For [facility]    EKG: NSR 61 bpm  TELE: NSR 70s    MEDICATIONS  (STANDING):  aspirin enteric coated 81 milliGRAM(s) Oral daily  atorvastatin 80 milliGRAM(s) Oral at bedtime  clopidogrel Tablet 75 milliGRAM(s) Oral daily  heparin  Infusion.  Unit(s)/Hr (10 mL/Hr) IV Continuous <Continuous>  hydrochlorothiazide 12.5 milliGRAM(s) Oral daily  losartan 50 milliGRAM(s) Oral daily  metoprolol succinate ER 50 milliGRAM(s) Oral daily  nitroglycerin     SubLingual 0.4 milliGRAM(s) SubLingual <User Schedule>    MEDICATIONS  (PRN):  heparin   Injectable 6000 Unit(s) IV Push every 6 hours PRN For aPTT less than 40      Allergies:  No Known Allergies      PAST MEDICAL & SURGICAL HISTORY:  Coronary artery disease  Hypercholesterolemia  Essential hypertension  S/P coronary artery stent placement      Vital Signs Last 24 Hrs  T(C): 36.8 (14 May 2020 09:30), Max: 36.8 (13 May 2020 17:40)  T(F): 98.3 (14 May 2020 09:30), Max: 98.3 (13 May 2020 17:40)  HR: 87 (14 May 2020 09:30) (54 - 87)  BP: 149/83 (14 May 2020 09:30) (111/59 - 154/90)  BP(mean): --  RR: 16 (14 May 2020 09:30) (16 - 20)  SpO2: 98% (14 May 2020 09:30) (96% - 99%)    Physical Exam:  Constitutional: NAD, AAOx3  Cardiovascular: +S1S2 RRR  Pulmonary: CTA b/l, unlabored  GI: soft NTND +BS  Extremities: no pedal edema, +distal pulses b/l  Neuro: non focal, ESCOBAR x4  ASA 2  Mallampati 2  GFR 95  BRA 0.7%    LABS:                        15.0   8.08  )-----------( 271      ( 14 May 2020 06:25 )             46.6     05-14    138  |  100  |  16.0  ----------------------------<  92  4.2   |  26.0  |  0.88    Ca    9.1      14 May 2020 06:25  Mg     2.3     05-14    TPro  6.7  /  Alb  4.1  /  TBili  0.4  /  DBili  x   /  AST  40<H>  /  ALT  29  /  AlkPhos  74  05-14    PT/INR - ( 13 May 2020 18:36 )   PT: 10.5 sec;   INR: 0.93 ratio         PTT - ( 14 May 2020 02:51 )  PTT:62.8 sec      RADIOLOGY & ADDITIONAL TESTS:  < from: TTE Echo w/Cont Complete (04.20.19 @ 14:02) >  PHYSICIAN INTERPRETATION:  Left Ventricle: The left ventricular internal cavity size is normal.   There is mild asymmetric left ventricular hypertrophy involving the   septal wall. The LVH involves septal walls.  Global LV systolic function was normal. Left ventricular ejection   fraction, by visual estimation, is 60 to 65%. Spectral Doppler shows   pseudonormal pattern of left ventricular myocardial filling (Grade II   diastolic dysfunction).  Right Ventricle: Normal right ventricular size and function. TV S' 0.1   m/s.  Left Atrium: The left atrium is normal in size.  Right Atrium: The right atrium is normal in size.  Pericardium: There is no evidence of pericardial effusion.  Mitral Valve: The mitral valve is normal in structure. No evidence of   mitral valve regurgitation is seen.  Tricuspid Valve: The tricuspid valve is normal in structure. Trivial   tricuspid regurgitation is visualized.  Aortic Valve: Normal trileaflet aortic valve with normal opening. Peak   transaortic gradient equals 6.1 mmHg, mean transaortic gradient equals   2.6 mmHg,the calculated aortic valve area equals 3.15 cm² by the   continuity equation consistent with normally opening aortic valve. No   evidence of aortic valve regurgitation is seen.  Pulmonic Valve: The pulmonic valve was not well visualized. No indication   of pulmonic valve regurgitation.  Aorta: The aortic root is normal in size and structure.  Pulmonary Artery: The pulmonary artery is of normal size and origin.  Venous: The inferior vena cava was normal sized, with respiratory size   variation greater than 50%.       Summary:   1. Normal left ventricular internal cavity size.   2. Normal global left ventricular systolic function.   3. Left ventricular ejection fraction, by visual estimation, is 60 to   65%.   4. There is mild asymmetric left ventricular hypertrophy.   5. Spectral Doppler shows pseudonormal pattern of left ventricular   myocardial filling (Grade II diastolic dysfunction).      < end of copied text >

## 2020-05-14 NOTE — DISCHARGE NOTE PROVIDER - NSDCFUADDINST_GEN_ALL_CORE_FT
15.7   8.52  )-----------( 269      ( 15 May 2020 07:46 )             48.2     05-14    138  |  100  |  16.0  ----------------------------<  92  4.2   |  26.0  |  0.88    Ca    9.1      14 May 2020 06:25  Mg     2.3     05-14    TPro  6.7  /  Alb  4.1  /  TBili  0.4  /  DBili  x   /  AST  40<H>  /  ALT  29  /  AlkPhos  74  05-14  Summary:   1. Left ventricular ejection fraction, by visual estimation, is 60 to 65%.   2. Technically limited study.   3. Normal global left ventricular systolic function.   4. Basal and mid inferolateral wall is abnormal as described above.   5. Spectral Doppler shows impaired relaxation pattern of left ventricular myocardial filling (Grade I diastolic dysfunction).   6. There is mild septal left ventricular hypertrophy.   7. Normal right ventricular size and function.   8. The left atrium is normal in size.   9. Mild mitral annular calcification.  10. Mild mitral valve regurgitation.  11. Thickening of the anterior and posterior mitral valve leaflets.  12. Sclerotic aortic valve with normal opening.    < end of copied text >

## 2020-05-14 NOTE — DISCHARGE NOTE PROVIDER - NSDCMRMEDTOKEN_GEN_ALL_CORE_FT
aspirin 81 mg oral tablet, chewable: 1 tab(s) orally once a day  losartan-hydrochlorothiazide 50mg-12.5mg oral tablet: 1 tab(s) orally once a day  metoprolol succinate 50 mg oral tablet, extended release: 1 tab(s) orally once a day  Plavix 75 mg oral tablet: 1 tab(s) orally once a day  simvastatin 20 mg oral tablet: 1 tab(s) orally once a day (at bedtime) aspirin 81 mg oral tablet, chewable: 1 tab(s) orally once a day  atorvastatin 80 mg oral tablet: 1 tab(s) orally once a day (at bedtime)  losartan-hydrochlorothiazide 50mg-12.5mg oral tablet: 1 tab(s) orally once a day  metoprolol succinate 50 mg oral tablet, extended release: 1 tab(s) orally once a day  ticagrelor 90 mg oral tablet: 1 tab(s) orally every 12 hours aspirin 81 mg oral tablet, chewable: 1 tab(s) orally once a day  atorvastatin 80 mg oral tablet: 1 tab(s) orally once a day (at bedtime)  clopidogrel 75 mg oral tablet: 1 tab(s) orally once a day  losartan-hydrochlorothiazide 50mg-12.5mg oral tablet: 1 tab(s) orally once a day  metoprolol succinate 50 mg oral tablet, extended release: 1 tab(s) orally once a day

## 2020-05-14 NOTE — ED ADULT NURSE REASSESSMENT NOTE - NS ED NURSE REASSESS COMMENT FT1
Pt resting in bed comfortably. Pt awaiting cardiac cath, Pt NPO. Pt A&Ox3. Pt denies pain N/VD.  VSS on CM. Safety maintained.

## 2020-05-14 NOTE — DISCHARGE NOTE PROVIDER - PROVIDER TOKENS
PROVIDER:[TOKEN:[[id number]]] PROVIDER:[TOKEN:[[id number]]] PROVIDER:[TOKEN:[[id number]]],FREE:[LAST:[Primary care physician],PHONE:[(   )    -],FAX:[(   )    -]]

## 2020-05-14 NOTE — DISCHARGE NOTE PROVIDER - NSDCCPCAREPLAN_GEN_ALL_CORE_FT
PRINCIPAL DISCHARGE DIAGNOSIS  Diagnosis: NSTEMI (non-ST elevated myocardial infarction)  Assessment and Plan of Treatment: You were having chest pain due to blockages in your coronary vessels of your heart. You had a susscessful cardiac catheterization on 5/14/20 where you had 3 Drug eluding stent placed. Continue taking brilinta, aspirin and metoprolol as prescribed. Please STOP taking plavix. Follow up with your cardiologist after discharge      SECONDARY DISCHARGE DIAGNOSES  Diagnosis: CAD S/P percutaneous coronary angioplasty  Assessment and Plan of Treatment: continue statin, brilinta, metoprolol, aspirin after discharge. Follow up with your primary care physician and cardiologist after discharge.    Diagnosis: HTN (hypertension)  Assessment and Plan of Treatment: Continue your medications. Follow a low salt diet. Follow up with your primary care physician and cardiologist after discharge. PRINCIPAL DISCHARGE DIAGNOSIS  Diagnosis: NSTEMI (non-ST elevated myocardial infarction)  Assessment and Plan of Treatment: You were having chest pain due to blockages in your coronary vessels of your heart. You had a susscessful cardiac catheterization on 5/14/20 where you had 3 Drug eluding stent placed. Continue taking Plavix, aspirin and metoprolol as prescribed.      SECONDARY DISCHARGE DIAGNOSES  Diagnosis: HTN (hypertension)  Assessment and Plan of Treatment: Continue your medications. Follow a low salt diet. Follow up with your primary care physician and cardiologist after discharge.    Diagnosis: CAD S/P percutaneous coronary angioplasty  Assessment and Plan of Treatment: continue statin, brilinta, metoprolol, aspirin after discharge. Follow up with your primary care physician and cardiologist after discharge. PRINCIPAL DISCHARGE DIAGNOSIS  Diagnosis: NSTEMI (non-ST elevated myocardial infarction)  Assessment and Plan of Treatment: You were having chest pain due to blockages in your coronary vessels of your heart. You had a susscessful cardiac catheterization on 5/14/20 where you had 3 Drug eluding stent placed. Continue taking Plavix, aspirin and metoprolol as prescribed.      SECONDARY DISCHARGE DIAGNOSES  Diagnosis: CAD S/P percutaneous coronary angioplasty  Assessment and Plan of Treatment: continue statin, plavix, metoprolol, aspirin after discharge. Follow up with your primary care physician and cardiologist after discharge.    Diagnosis: HTN (hypertension)  Assessment and Plan of Treatment: Continue your medications. Follow a low salt diet. Follow up with your primary care physician and cardiologist after discharge.

## 2020-05-14 NOTE — PROGRESS NOTE ADULT - SUBJECTIVE AND OBJECTIVE BOX
INTERVAL HPI/OVERNIGHT EVENTS:  Patient seen and examined at bedside. No acute events overnight. Patient is NPO for cath today. Pt has no acute complaints.    ROS: Denies CP, SOB, ENRIQUEZ, abd pain, nausea/vomiting, diarrhea/constipation, fever chills, LE pain.    Vital Signs Last 24 Hrs  T(C): 36.8 (14 May 2020 09:30), Max: 36.8 (13 May 2020 17:40)  T(F): 98.3 (14 May 2020 09:30), Max: 98.3 (13 May 2020 17:40)  HR: 81 (14 May 2020 15:15) (54 - 87)  BP: 136/86 (14 May 2020 15:15) (111/59 - 154/90)  RR: 19 (14 May 2020 15:15) (16 - 20)  SpO2: 99% (14 May 2020 15:15) (93% - 99%)      PHYSICAL EXAM:  GEN:  male lying comfortably in bed, NAD  HEENT: NC/AT, PEERL, EOMI, clear sclera, moist oral mucosa  CVS: +S1, +S2  Pul: CTA B/l, no crackles  GI: +obsese abd, soft, NT, ND, +BS  Ext: warm, NT, no rash, no pedal edema  Neuro: AAOx4, not agitated    LABS:                        15.0   8.08  )-----------( 271      ( 14 May 2020 06:25 )             46.6     05-14    138  |  100  |  16.0  ----------------------------<  92  4.2   |  26.0  |  0.88    Ca    9.1      14 May 2020 06:25  Mg     2.3     05-14    TPro  6.7  /  Alb  4.1  /  TBili  0.4  /  DBili  x   /  AST  40<H>  /  ALT  29  /  AlkPhos  74  05-14    PT/INR - ( 13 May 2020 18:36 )   PT: 10.5 sec;   INR: 0.93 ratio         PTT - ( 14 May 2020 10:18 )  PTT:45.3 sec    A1C with Estimated Average Glucose Result: 6.2 % (05.14.20 @ 06:25)    RADIOLOGY & ADDITIONAL TESTS:  < from: Xray Chest 1 View- PORTABLE-Urgent (05.13.20 @ 18:25) >  Findings:  Thelungs are clear. The heart size is normal. The visualized osseous structures are unremarkable.    Impression:  1.  Clear lungs without interval change when compared with 4/20/2019.      DISCRETE X-RAY DATA:  Percent of LEFT lung opacification: Clear  Percent of RIGHT lung opacification: Clear  Change in lung opacification from most recent x-ray (<=3 days): No Prior  Change from prior dated 3 or more days (same admission): No Prior    < end of copied text >    < from: TTE Echo Complete w/o Doppler (05.13.20 @ 21:18) >  Summary:   1. Left ventricular ejection fraction, by visual estimation, is 60 to 65%.   2. Technically limited study.   3. Normal global left ventricular systolic function.   4. Basal and mid inferolateral wall is abnormal as described above.   5. Spectral Doppler shows impaired relaxation pattern of left ventricular myocardial filling (Grade I diastolic dysfunction).   6. There is mild septal left ventricular hypertrophy.   7. Normal right ventricular size and function.   8. The left atrium is normal in size.   9. Mild mitral annular calcification.  10. Mild mitral valve regurgitation.  11. Thickening of the anterior and posterior mitral valve leaflets.  12. Sclerotic aortic valve with normal opening.    < end of copied text >      MEDICATIONS  (STANDING):  aspirin enteric coated 81 milliGRAM(s) Oral daily  atorvastatin 80 milliGRAM(s) Oral at bedtime  hydrochlorothiazide 12.5 milliGRAM(s) Oral daily  losartan 50 milliGRAM(s) Oral daily  metoprolol succinate ER 50 milliGRAM(s) Oral daily

## 2020-05-14 NOTE — DISCHARGE NOTE PROVIDER - HOSPITAL COURSE
59-year-old male with PMHx of Hypertension, Hyperlipidemia, CAD s/p PCI with stent placement on 2010 now presenting with ACS. ECHO with EF 60-65%. Patient s/p cardiac cath. Found to have Severe 2 vessel disease. PCI x 3 TANIA. Patient started on brilinta, medical management. Patient counselled to discontinue plavix. Home medications restarted for hypertension, HLD. Cardiology consulted and following.        All electrolyte abnormalities were monitored carefully and repleted as necessary during this hospitalization. At the time of discharge patient was hemodynamically stable and amenable to all terms of discharge. The patient has received verbal instructions from myself regarding discharge plans.         Length of Discharge: 45MIN        Vital Signs Last 24 Hrs    T(C): 37.2 (15 May 2020 07:05), Max: 37.2 (15 May 2020 07:05)    T(F): 99 (15 May 2020 07:05), Max: 99 (15 May 2020 07:05)    HR: 80 (15 May 2020 07:05) (66 - 87)    BP: 104/66 (15 May 2020 07:05) (104/66 - 149/83)    RR: 17 (15 May 2020 07:05) (16 - 19)    SpO2: 99% (14 May 2020 15:15) (93% - 99%)        Physical Exam:    Constitutional: NAD, AAOx3    Cardiovascular: +S1S2 RRR    Pulmonary: CTA b/l, unlabored    GI: soft NTND +BS    Extremities: no pedal edema, +distal pulses b/l    Neuro: non focal, ESCOBAR x4    Procedure site: Right radial band in place; site benign without hematoma/bleeding; RUE warm/mobile/acyanotic; + right ulnar pulse 59-year-old male with PMHx of Hypertension, Hyperlipidemia, CAD s/p PCI with stent placement on 2010 now presenting with ACS. ECHO with EF 60-65%. Patient s/p cardiac cath. Found to have Severe 2 vessel disease. PCI x 3 TANIA. Patient started on brilinta, medical management. Patient counselled to continue plavix outpt as brilinta is too expensive. Home medications restarted for hypertension, HLD. Cardiology consulted and following.        All electrolyte abnormalities were monitored carefully and repleted as necessary during this hospitalization. At the time of discharge patient was hemodynamically stable and amenable to all terms of discharge. The patient has received verbal instructions from myself regarding discharge plans.         Length of Discharge: 45MIN        Vital Signs Last 24 Hrs    T(C): 37.2 (15 May 2020 07:05), Max: 37.2 (15 May 2020 07:05)    T(F): 99 (15 May 2020 07:05), Max: 99 (15 May 2020 07:05)    HR: 80 (15 May 2020 07:05) (66 - 87)    BP: 104/66 (15 May 2020 07:05) (104/66 - 149/83)    RR: 17 (15 May 2020 07:05) (16 - 19)    SpO2: 99% (14 May 2020 15:15) (93% - 99%)        Physical Exam:    Constitutional: NAD, AAOx3    Cardiovascular: +S1S2 RRR    Pulmonary: CTA b/l, unlabored    GI: soft NTND +BS    Extremities: no pedal edema, +distal pulses b/l    Neuro: non focal, ESCOBAR x4    Procedure site: Right radial band in place; site benign without hematoma/bleeding; RUE warm/mobile/acyanotic; + right ulnar pulse

## 2020-05-15 ENCOUNTER — TRANSCRIPTION ENCOUNTER (OUTPATIENT)
Age: 60
End: 2020-05-15

## 2020-05-15 VITALS — DIASTOLIC BLOOD PRESSURE: 71 MMHG | TEMPERATURE: 99 F | SYSTOLIC BLOOD PRESSURE: 105 MMHG | RESPIRATION RATE: 18 BRPM

## 2020-05-15 LAB
ANION GAP SERPL CALC-SCNC: 14 MMOL/L — SIGNIFICANT CHANGE UP (ref 5–17)
BUN SERPL-MCNC: 17 MG/DL — SIGNIFICANT CHANGE UP (ref 8–20)
CALCIUM SERPL-MCNC: 9.2 MG/DL — SIGNIFICANT CHANGE UP (ref 8.6–10.2)
CHLORIDE SERPL-SCNC: 97 MMOL/L — LOW (ref 98–107)
CO2 SERPL-SCNC: 26 MMOL/L — SIGNIFICANT CHANGE UP (ref 22–29)
CREAT SERPL-MCNC: 1.01 MG/DL — SIGNIFICANT CHANGE UP (ref 0.5–1.3)
GLUCOSE SERPL-MCNC: 98 MG/DL — SIGNIFICANT CHANGE UP (ref 70–99)
HCT VFR BLD CALC: 48.2 % — SIGNIFICANT CHANGE UP (ref 39–50)
HGB BLD-MCNC: 15.7 G/DL — SIGNIFICANT CHANGE UP (ref 13–17)
MAGNESIUM SERPL-MCNC: 2.2 MG/DL — SIGNIFICANT CHANGE UP (ref 1.6–2.6)
MCHC RBC-ENTMCNC: 31.8 PG — SIGNIFICANT CHANGE UP (ref 27–34)
MCHC RBC-ENTMCNC: 32.6 GM/DL — SIGNIFICANT CHANGE UP (ref 32–36)
MCV RBC AUTO: 97.6 FL — SIGNIFICANT CHANGE UP (ref 80–100)
PHOSPHATE SERPL-MCNC: 3.3 MG/DL — SIGNIFICANT CHANGE UP (ref 2.4–4.7)
PLATELET # BLD AUTO: 269 K/UL — SIGNIFICANT CHANGE UP (ref 150–400)
POTASSIUM SERPL-MCNC: 3.9 MMOL/L — SIGNIFICANT CHANGE UP (ref 3.5–5.3)
POTASSIUM SERPL-SCNC: 3.9 MMOL/L — SIGNIFICANT CHANGE UP (ref 3.5–5.3)
RBC # BLD: 4.94 M/UL — SIGNIFICANT CHANGE UP (ref 4.2–5.8)
RBC # FLD: 12.1 % — SIGNIFICANT CHANGE UP (ref 10.3–14.5)
SODIUM SERPL-SCNC: 137 MMOL/L — SIGNIFICANT CHANGE UP (ref 135–145)
WBC # BLD: 8.52 K/UL — SIGNIFICANT CHANGE UP (ref 3.8–10.5)
WBC # FLD AUTO: 8.52 K/UL — SIGNIFICANT CHANGE UP (ref 3.8–10.5)

## 2020-05-15 PROCEDURE — 93010 ELECTROCARDIOGRAM REPORT: CPT

## 2020-05-15 PROCEDURE — 99232 SBSQ HOSP IP/OBS MODERATE 35: CPT

## 2020-05-15 RX ORDER — ATORVASTATIN CALCIUM 80 MG/1
1 TABLET, FILM COATED ORAL
Qty: 30 | Refills: 0
Start: 2020-05-15 | End: 2020-06-13

## 2020-05-15 RX ORDER — SIMVASTATIN 20 MG/1
1 TABLET, FILM COATED ORAL
Qty: 0 | Refills: 0 | DISCHARGE

## 2020-05-15 RX ORDER — TICAGRELOR 90 MG/1
1 TABLET ORAL
Qty: 60 | Refills: 0
Start: 2020-05-15 | End: 2020-06-13

## 2020-05-15 RX ORDER — CLOPIDOGREL BISULFATE 75 MG/1
1 TABLET, FILM COATED ORAL
Qty: 90 | Refills: 6
Start: 2020-05-15 | End: 2022-02-03

## 2020-05-15 RX ADMIN — TICAGRELOR 180 MILLIGRAM(S): 90 TABLET ORAL at 05:25

## 2020-05-15 RX ADMIN — Medication 12.5 MILLIGRAM(S): at 05:25

## 2020-05-15 RX ADMIN — LOSARTAN POTASSIUM 50 MILLIGRAM(S): 100 TABLET, FILM COATED ORAL at 05:25

## 2020-05-15 RX ADMIN — Medication 50 MILLIGRAM(S): at 05:25

## 2020-05-15 NOTE — PROGRESS NOTE ADULT - SUBJECTIVE AND OBJECTIVE BOX
[facility], [facility]                                          [address], [address], [place] [address]                                               PHONE: [phone]    FAX: [phone] [address], [address], [place] [address]                                       PHONE: [phone]    FAX: [phone]  *******************************************************************************    Overnight events/Subjective Assessment: s/p cardiac cath yesterday and TANIA x 3.  No further CP/SOB/palpitations.    INTERPRETATION OF TELEMETRY (personally reviewed): SR without significant ectopy.    No Known Allergies    MEDICATIONS  (STANDING):  aspirin enteric coated 81 milliGRAM(s) Oral daily  atorvastatin 80 milliGRAM(s) Oral at bedtime  heparin   Injectable 5000 Unit(s) SubCutaneous every 12 hours  hydrochlorothiazide 12.5 milliGRAM(s) Oral daily  losartan 50 milliGRAM(s) Oral daily  metoprolol succinate ER 50 milliGRAM(s) Oral daily  ticagrelor 90 milliGRAM(s) Oral every 12 hours    MEDICATIONS  (PRN):      Vital Signs Last 24 Hrs  T(C): 37.2 (15 May 2020 07:05), Max: 37.2 (15 May 2020 07:05)  T(F): 99 (15 May 2020 07:05), Max: 99 (15 May 2020 07:05)  HR: 80 (15 May 2020 07:05) (73 - 85)  BP: 104/66 (15 May 2020 07:05) (104/66 - 140/77)  BP(mean): --  RR: 17 (15 May 2020 07:05) (16 - 19)  SpO2: 99% (14 May 2020 15:15) (93% - 99%)    I&O's Detail    14 May 2020 07:01  -  15 May 2020 07:00  --------------------------------------------------------  IN:    heparin  Infusion.: 10 mL    Oral Fluid: 240 mL  Total IN: 250 mL    OUT:    Voided: 500 mL  Total OUT: 500 mL    Total NET: -250 mL        I&O's Summary    14 May 2020 07:01  -  15 May 2020 07:00  --------------------------------------------------------  IN: 250 mL / OUT: 500 mL / NET: -250 mL            PHYSICAL EXAM:  General: Appears well developed, well nourished, no acute distress  HEENT: Head: normocephalic, atraumatic  Eyes: Pupils equal and reactive  Neck: Supple, no carotid bruit, no JVD, no HJR  CARDIOVASCULAR: Normal S1 and S2, no murmur, rub, or gallop  LUNGS: Clear to auscultation bilaterally, no rales, rhonchi or wheeze  ABDOMEN: Soft, nontender, non-distended, positive bowel sounds, no mass or bruit  EXTREMITIES: No edema, distal pulses WNL.  Rt radial access site: + pulse, minimal bruising, no hematoma.  SKIN: Warm and dry with normal turgor  NEURO: Alert & oriented x 3, grossly intact  PSYCH: normal mood and affect          LABS:                        15.7   8.52  )-----------( 269      ( 15 May 2020 07:46 )             48.2     05-15    137  |  97<L>  |  17.0  ----------------------------<  98  3.9   |  26.0  |  1.01    Ca    9.2      15 May 2020 07:46  Phos  3.3     05-15  Mg     2.2     05-15    TPro  6.7  /  Alb  4.1  /  TBili  0.4  /  DBili  x   /  AST  40<H>  /  ALT  29  /  AlkPhos  74  05-14    CARDIAC MARKERS ( 14 May 2020 06:25 )  x     / 0.37 ng/mL / 624 U/L / x     / 37.8 ng/mL  CARDIAC MARKERS ( 14 May 2020 00:15 )  x     / 0.20 ng/mL / 521 U/L / x     / 30.2 ng/mL  CARDIAC MARKERS ( 13 May 2020 18:36 )  x     / 0.12 ng/mL / x     / x     / x          PT/INR - ( 13 May 2020 18:36 )   PT: 10.5 sec;   INR: 0.93 ratio         PTT - ( 14 May 2020 10:18 )  PTT:45.3 sec  serum  Lipids:           ECHO:  < from: TTE Echo Complete w/o Doppler (05.13.20 @ 21:18) >  PHYSICIAN INTERPRETATION:  Left Ventricle: The left ventricular internal cavity size is normal.  Global LV systolic function was normal. Left ventricular ejection fraction, by visual estimation, is 60 to 65%. Spectral Doppler shows impaired relaxation pattern of left ventricular myocardialfilling (Grade I diastolic dysfunction).       LV Wall Scoring:  The basal and mid inferolateral wall is hypokinetic.    Right Ventricle: Normal right ventricular size and function. The right ventricle was not well visualized. The right ventricular size is normal. RV systolic function is normal.  Left Atrium: The left atrium is normal in size. Normal left atrial size.  Right Atrium: The right atrium was not well visualized.  Pericardium: There is no evidence of pericardial effusion.  Mitral Valve: Thickening of the anterior and posterior mitral valve leaflets. There is mild mitral annular calcification. Mild mitral valve regurgitation is seen.  Tricuspid Valve: The tricuspid valve is not well seen. Trivial tricuspid regurgitation is visualized. Adequate TR velocity was not obtained to accurately assess RVSP.  Aortic Valve: The aortic valve was not well visualized. The aortic valve is trileaflet. Sclerotic aortic valve with normal opening. No evidence of aortic valve regurgitation is seen.  Pulmonic Valve: The pulmonic valve was not well visualized. No indication of pulmonic valve regurgitation.  Aorta: The aortic root is normal in size and structure.  Pulmonary Artery: The pulmonary artery is of normal size and origin.  Venous: The inferior vena cava is normal. The inferior vena cava was normal sized, with respiratory size variation greater than 50%.  In comparison to the previous echocardiogram(s): Prior examinations are available and were reviewed for comparison purposes.       Summary:   1. Left ventricular ejection fraction, by visual estimation, is 60 to 65%.   2. Technically limited study.   3. Normal global left ventricular systolic function.   4. Basal and mid inferolateral wall is abnormal as described above.   5. Spectral Doppler shows impaired relaxation pattern of left ventricular myocardial filling (Grade I diastolic dysfunction).   6. There is mild septal left ventricular hypertrophy.   7. Normal right ventricular size and function.   8. The left atrium is normal in size.   9. Mild mitral annular calcification.  10. Mild mitral valve regurgitation.  11. Thickening of the anterior and posterior mitral valve leaflets.  12. Sclerotic aortic valve with normal opening.    < end of copied text >      CARDIAC CATHETERIZATION:  < from: Cardiac Cath Lab - Adult (05.14.20 @ 10:16) >  INTERVENTIONAL IMPRESSIONS: - Severe 2 vessel CAD  - The previously placed OM1 stent (from 2010) is chronically occluded. The   distal vessel fills via collaterals  - Successful PCI of the proximal LCx with a 4.0mm Resolute TANIA  - Successful PCI of the OM2 with a 3.0mm Resolute TANIA (culprit)  - Successful PCI of the ulcerated mid RCA with a 2.5mm Resolute TANIA   (culprit)  - Overall normal LV systolic function with RWMA    < end of copied text >      ASSESSMENT AND PLAN:  MARY BETH CHA is a 59M a/w chest pain/NSTEMI, h/o CAD s/p PCI/stent '10, HTN, HLD.      - Troponin peak at 0.37 with elevated CK and CKMB.  Consistent with NSTEMI.  - Cardiac cath 5/14 showed severe 2 vessel CAD.  TANIA to prox LCx, OM2 and mid RCA.  The previously placed OM1 stent was chronically occluded with collateral circulation.  - Continue Losartan and Toprol XL.  Can continue HCTZ 12.5 for now.    - ASA 81 mg PO daily Continue Plavix 75 mg PO daily.  Pt's copay for Brilinta is prohibitive.  - Lipitor 80 mg  - Stable for discharge home today.  Outpatient follow up with Dr. Tellez at the [facility] next week         Curry Cardozo MD

## 2020-05-15 NOTE — DISCHARGE NOTE NURSING/CASE MANAGEMENT/SOCIAL WORK - PATIENT PORTAL LINK FT
You can access the FollowMyHealth Patient Portal offered by [facility] by registering at the following website: [email or web address]. By joining [employer]’s FollowMyHealth portal, you will also be able to view your health information using other applications (apps) compatible with our system.

## 2020-05-15 NOTE — DISCHARGE NOTE NURSING/CASE MANAGEMENT/SOCIAL WORK - NSDCFUADDAPPT_GEN_ALL_CORE_FT
Follow up with Dr. Tellez in one week    Restricted use with no heavy lifting of affected arm for 48 hours.  No submerging the arm in water for 48 hours.  You may start showering today.  Call your doctor for any bleeding, swelling, loss of sensation in the hand or fingers, or fingers turning blue.  If heavy bleeding or large lumps form, hold pressure at the spot and come to the Emergency Room.    Choose lean meats and poultry without skin and prepare them without added saturated and trans fat.  Eat fish at least twice a week. Recent research shows that eating oily fish containing omega-3 fatty acids (for example, salmon, trout and herring) may help lower your risk of death from coronary artery disease.  Select fat-free, 1 percent fat and low-fat dairy products.  Cut back on foods containing partially hydrogenated vegetable oils to reduce trans fat in your diet.   To lower cholesterol, reduce saturated fat to no more than 5 to 6 percent of total calories. For someone eating 2,000 calories a day, that’s about 13 grams of saturated fat.  Cut back on beverages and foods with added sugars.  Choose and prepare foods with little or no salt. To lower blood pressure, aim to eat no more than 2,400 milligrams of sodium per day. Reducing daily intake to 1,500 mg is desirable because it can lower blood pressure even further.  If you drink alcohol, drink in moderation. That means one drink per day if you’re a woman and two drinks  per day if you’re a man.  Follow the American Heart Association recommendations when you eat out, and keep an eye on your portion sizes.

## 2020-05-17 PROCEDURE — 99153 MOD SED SAME PHYS/QHP EA: CPT

## 2020-05-17 PROCEDURE — 93458 L HRT ARTERY/VENTRICLE ANGIO: CPT | Mod: 59

## 2020-05-17 PROCEDURE — 85027 COMPLETE CBC AUTOMATED: CPT

## 2020-05-17 PROCEDURE — T1013: CPT

## 2020-05-17 PROCEDURE — C1887: CPT

## 2020-05-17 PROCEDURE — C1769: CPT

## 2020-05-17 PROCEDURE — 99152 MOD SED SAME PHYS/QHP 5/>YRS: CPT

## 2020-05-17 PROCEDURE — 80048 BASIC METABOLIC PNL TOTAL CA: CPT

## 2020-05-17 PROCEDURE — 93005 ELECTROCARDIOGRAM TRACING: CPT

## 2020-05-17 PROCEDURE — 99285 EMERGENCY DEPT VISIT HI MDM: CPT | Mod: 25

## 2020-05-17 PROCEDURE — 36415 COLL VENOUS BLD VENIPUNCTURE: CPT

## 2020-05-17 PROCEDURE — 82553 CREATINE MB FRACTION: CPT

## 2020-05-17 PROCEDURE — 85730 THROMBOPLASTIN TIME PARTIAL: CPT

## 2020-05-17 PROCEDURE — C9601: CPT | Mod: LC

## 2020-05-17 PROCEDURE — 82550 ASSAY OF CK (CPK): CPT

## 2020-05-17 PROCEDURE — 93307 TTE W/O DOPPLER COMPLETE: CPT

## 2020-05-17 PROCEDURE — C1725: CPT

## 2020-05-17 PROCEDURE — C9606: CPT | Mod: LC

## 2020-05-17 PROCEDURE — 80061 LIPID PANEL: CPT

## 2020-05-17 PROCEDURE — 85610 PROTHROMBIN TIME: CPT

## 2020-05-17 PROCEDURE — 80053 COMPREHEN METABOLIC PANEL: CPT

## 2020-05-17 PROCEDURE — 87635 SARS-COV-2 COVID-19 AMP PRB: CPT

## 2020-05-17 PROCEDURE — 83735 ASSAY OF MAGNESIUM: CPT

## 2020-05-17 PROCEDURE — C1874: CPT

## 2020-05-17 PROCEDURE — C9600: CPT | Mod: RC

## 2020-05-17 PROCEDURE — 84100 ASSAY OF PHOSPHORUS: CPT

## 2020-05-17 PROCEDURE — 83036 HEMOGLOBIN GLYCOSYLATED A1C: CPT

## 2020-05-17 PROCEDURE — 84484 ASSAY OF TROPONIN QUANT: CPT

## 2020-05-17 PROCEDURE — 71045 X-RAY EXAM CHEST 1 VIEW: CPT

## 2021-01-14 NOTE — DISCHARGE NOTE PROVIDER - NSDCACTIVITY_GEN_ALL_CORE
MADONNA [id number]    Doing well. LUQ pain has almost completely resolved! Feels like baby has dropped, +FM  Intermittent hector gao contractions  No LOF, VB  2 pfannenstiel skin incisions noted    1. FHT-present  2. PNL:  HIV negative  3.  Mode of delivery: RCS Showering allowed/Bathing allowed

## 2021-10-26 NOTE — ED ADULT NURSE NOTE - LANGUAGE ASSISTANCE NEEDED
"    10/26/2021         RE: Jonna Banks  [address]  [place] [address]        Dear Colleague,    Thank you for referring your patient, Jonna Banks, to the [facility]. Please see a copy of my visit note below.    Oncology Rooming Note    October 26, 2021 3:09 PM   Jonna Banks is a 77 year old female who presents for:    Chief Complaint   Patient presents with     Oncology Clinic Visit     Initial Vitals: BP (!) 146/74   Pulse 87   Temp 98.6  F (37  C)   Resp 16   Wt 57.7 kg (127 lb 1.6 oz)   SpO2 98%   BMI 26.56 kg/m   Estimated body mass index is 26.56 kg/m  as calculated from the following:    Height as of 10/18/21: 1.473 m (4' 10\").    Weight as of this encounter: 57.7 kg (127 lb 1.6 oz). Body surface area is 1.54 meters squared.  Severe Pain (7) Comment: lower back   No LMP recorded. Patient is postmenopausal.  Allergies reviewed: Yes  Medications reviewed: Yes    Medications: Medication refills not needed today.  Pharmacy name entered into [employer]:    [id number] [place], [place] - [address] AT [facility]  [id number] - [place], [place] - [address]    Clinical concerns: None       Lanette Hong LPN              [facility] Hematology and Oncology Progress Note    Patient: Jonna Banks  MRN: [id number]  Date of Service: Oct 26, 2021         Reason for Visit    Follow-up regarding monoclonal gammopathy of unknown significance.    Assessment and Plan    ECOG Performance    0 - Independent     Pain  Pain Score: Severe Pain (7) (lower back)      Ms.Enriqueta DAVY Banks is a 77 year old woman who was diagnosed to have chronic kidney disease stage III after her creatinine which is usually running in the 0.9 range increased to about 1.16 in late 2020.  She was referred to nephrology and when she had her work-up done with [facility], she was found to have 1.1 g/dL monoclonal gammopathy in " the blood work that was done on 12/4/2020.  She was found to have an IgM kappa monoclonal immunoglobulin in the serum immunofixation, a monoclonal free kappa light chain in the urine and a serum free light chains with a significantly elevated kappa free light chain compared to the lambda light chain.  The kappa to lambda ratio was elevated at 10.04.  She was then referred to hematology.     The images of the x-ray bone survey from 2/10/2021 showed no lytic bone lesions of myeloma.  She does have some degenerative changes in her spine and we can see the calcified gallstones.   Regarding the blood work: The SPEP showed a 0.9 g/dL monoclonal spike which on immunofixation was an IgM kappa monoclonal protein.  Serum free light chains again showed a preponderance of kappa.  The striking finding was a significantly elevated IgM level of 1631 mg/dL in the quantitative immunoglobulins.  The IgG was normal.  IgA was somewhat reduced at 15 mg/dL.  LDH was normal.  Beta-2 microglobulin was elevated at 3.8 mg/L.  The CMP from 2/10/2021 showed normal electrolytes.  Calcium was not elevated.  Creatinine was back to normal at 1 mg/dL.  LFTs were remarkable only for the total protein being slightly elevated reflecting the monoclonal gammopathy.  I suspect that she has an IgM producing lymphoma involving her marrow like it Waldenström's macroglobulinemia or a marginal zone lymphoma but it is not really causing any endorgan damage.  Thus we decided to keep her in follow-up.  Follow-up that was done 6 months later, there was really not much difference.    1.  She is here for follow-up after another 2 months.  She and her daughter are worried that there is something going on.  The main concern is about the back pain that started after the laparoscopic cholecystectomy.  I suspect a back strain more than anything else.  I discussed with him and decided to have an x-ray of the spine done.  X-rays of the thoracic and lumbar spine were  ordered which she can have scheduled according to her convenience.  If there is anything concerning there is seen in the we will contact the patient's daughter.  They were agreeable to the plan.    2.  Regarding the skin lesions that they were worried, I really do not see anything suspicious on her back now.  The daughter herself admits that the spots that she had noticed earlier now disappeared.  She has 1 lesion of her senile keratosis which looks quite benign.  I explained to them that this is nothing to worry about.    3.  The main issue is about the monoclonal gammopathy and what needs to be done about that.  I discussed with them that her kidney function is only mildly higher than normal.  She is qualifying for stage IIIa kidney disease mainly because of her age rather than the creatinine level.  I do not think that the IgM monoclonal gammopathy is contributing to kidney dysfunction.  I asked him to meet with a nephrologist if they have more questions about the kidney failure issue.  I discussed with them what to do about the monoclonal gammopathy.  I think she has either a Waldenström's macroglobulinemia or something like a marginal zone lymphoma that is likely causing the IgM monoclonal gammopathy but it really does not appear to be doing any endorgan damage.  I explained to them that if she wants to have a bone marrow biopsy done we can certainly do that to clarify the diagnosis.  However I did not feel the need to press her to do the biopsy since it is a mildly painful procedure and since she will anyway need a bone marrow biopsy to be done when she reaches the point where she needs treatment for the lymphoma.  I again explained to them the procedure is needed for the bone marrow biopsy and possible side effects.  After much back-and-forth questioning and discussion between mother and daughter, Ms. Banks decided that she wants to have the bone marrow biopsy done.  She has signed a consent form for a  bilateral bone marrow biopsy and aspiration.    I have also explained to the patient and her daughter that if she changes her mind, she can certainly cancel the plan for the bone marrow biopsy.      4.  I have asked for a bilateral bone marrow biopsy and aspiration to be scheduled in the coming weeks according to the convenience of the patient.  We will send samples for morphology, flow cytometry, chromosomes and FISH studies.  When she comes with a bone marrow biopsy she can have the labs ordered for February to be drawn also.  Return to clinic with me about a week or 2 after the bone marrow biopsy is done to discuss the results and decide on a management plan.    Time spend >45 minutes face-to-face time with the patient.  More than 50% of the time was spent in counseling and coordination of care.      Encounter Diagnoses:    Problem List Items Addressed This Visit        Immune    IgM monoclonal gammopathy of uncertain significance - Primary    Relevant Orders    Bone marrow biopsy/aspiration       Urinary    Stage 3a chronic kidney disease (H)      Other Visit Diagnoses     Chronic midline thoracic back pain        Relevant Orders    XR Thoracic Lumbar Spine 2 Views    XR Thoracic Spine 2 Views             CC: Elvia Hidalgo MD   ______________________________________________________________________________    History of Present Illness    Ms. Jonna Banks is here with one of her daughters.  She was helping her with dealing with the language also.  They did not want a professional .    They decided to come back and see me after 2 months because they were worried about the monoclonal gammopathy.  She had a laparoscopic cholecystectomy done.  After that she is complaining of some back pain that comes and goes.  She says that it is more noticeable at night.  Her daughter has been massaging her back at night and then she thought she saw 2 spots on her skin which made her feel even more worried  about the possibility of malignancy.  She states that she feels the pain more when she bends over.    She has some very occasional mild dizziness.  No other neurological complaints.    It appears that the main issue is that they are still worried about the chronic kidney disease.  Apparently her daughters who are back home in [place] are worried that a full work-up for the monoclonal company has not been done and are encouraging her to have the bone marrow biopsy done.    Review of systems.  No fever or night sweats.  No loss of weight.  No lumps or bumps anywhere.  No unusual headaches or eyesight issues.  No bleeding from the nose.  No sores in the mouth. No problems with swallowing.  No chest pain. No shortness of breath. No cough.  No abdominal pain. No nausea or vomiting.  No diarrhea or constipation.  No blood in stool or black colored stools.  No problems passing urine.  No numbness or tingling in hands or feet.  No skin rashes.  A 14 point review of systems is otherwise negative.            Past History    Past Medical History:   Diagnosis Date     Arthritis      Calculus of gallbladder      Disorder of bone and cartilage      Diverticulosis 07/16/2015     GERD (gastroesophageal reflux disease)      History of colonic polyps     on colonosocopy in 2007     Hypertension      Other chronic nonalcoholic liver disease      PONV (postoperative nausea and vomiting)      Stage 3a chronic kidney disease (H)      Thyroid disease        Past Surgical History:   Procedure Laterality Date     COLONOSCOPY  07/16/2015     COLONOSCOPY W/ POLYPECTOMY  2007     LAPAROSCOPIC CHOLECYSTECTOMY N/A 10/1/2021    Procedure: CHOLECYSTECTOMY, LAPAROSCOPIC;  Surgeon: Bro Sargent MD;  Location: [facility]     SALIVARY GLAND SURGERY Bilateral     submandibular galnd. In her 50s.     TUBAL LIGATION      in the 40s.     VAGINAL PROLAPSE REPAIR      in her 50s in [place]         Physical Exam    BP (!) 146/74   Pulse 87   Temp  98.6  F (37  C)   Resp 16   Wt 57.7 kg (127 lb 1.6 oz)   SpO2 98%   BMI 26.56 kg/m        GENERAL: Alert and oriented to time place and person. Seated comfortably. In no distress.  She looks well overall.  Normal build.  She looks good for her age.    HEAD: Atraumatic and normocephalic.    EYES: THONY, EOMI.  No pallor.  No icterus.    Oral cavity: no mucosal lesion or tonsillar enlargement.    NECK: supple. JVP normal.  No thyroid enlargement.    LYMPH NODES: No palpable, cervical, axillary or inguinal lymphadenopathy.    CHEST: clear to auscultation bilaterally.  Resonant to percussion throughout bilaterally.  Symmetrical breath movements bilaterally.    CVS: S1 and S2 are heard. Regular rate and rhythm.  No murmur or gallop or rub heard.  No peripheral edema.    ABDOMEN: Soft. Not tender. Not distended.  No palpable hepatomegaly or splenomegaly.  No other mass palpable.  Bowel sounds heard.    EXTREMITIES: Warm.  She has some arthritic changes in her fingers.    SPINE: Mild gentle kyphosis in the thoracic area appears unchanged.  No tenderness on palpation over the spine.  No step-off.    SKIN: no rash, or bruising or purpura.  He has a 0.5 cm lesion of senile keratosis on the skin of her back.  No other suspicious skin lesion.  Has a full head of hair.      Lab Results    No results found for this or any previous visit (from the past 240 hour(s)).      Imaging    No results found.      Signed by: Nadia Humphrey MD      Again, thank you for allowing me to participate in the care of your patient.        Sincerely,        Nadia Humphrey MD     Yes-Patient/Caregiver accepts free interpretation services...

## 2022-04-26 NOTE — DISCHARGE NOTE PROVIDER - NSDCCPGOAL_GEN_ALL_CORE_FT
To get better and follow your care plan as instructed. Dupixent Counseling: I discussed with the patient the risks of dupilumab including but not limited to eye infection and irritation, cold sores, injection site reactions, worsening of asthma, allergic reactions and increased risk of parasitic infection.  Live vaccines should be avoided while taking dupilumab. Dupilumab will also interact with certain medications such as warfarin and cyclosporine. The patient understands that monitoring is required and they must alert us or the primary physician if symptoms of infection or other concerning signs are noted.

## 2022-06-12 NOTE — ED PROVIDER NOTE - CONSTITUTIONAL NEGATIVE STATEMENT, MLM
"Goal Outcome Evaluation:           Progress: improving  Outcome Evaluation: B/P increased around midnight, patient requested medication change to Toprol XL, stated \"it works the best for me\", Dr. Romero notified and patient will start new med this a.m.  Pt does not wish to board after discharge.  " no fever and no chills.

## 2023-03-27 NOTE — ED ADULT NURSE NOTE - PMH
Subjective   Patient ID: Liz Arango is a 27 y.o. female who presents for Annual Exam (Pt.has question about prescriptions/Fasting-no/Obgyn-[place]/Endo-no/Therapist-No/Decline the vaccine/).  Is pt fasting? no  Does pt see any providers other than care team below: no  Ob gyn and endo    Name of therapist-not seeing one  Does pt want gardasil vaccine? no    Phq9=0, gad7=0    Patient Care Team     Relationship Specialty Notifications Start End   Anna Pineda MD Consulting Physician Pediatric Endocrinology Admissions 3/27/23     Address:  [id number] [place] [address] Pediatrics-Endocrinology/Metabolism [facility] [address]       HPI  Last labs-12/1/22 tsh, t4, t3, tpo, a1c, cbc, cmp-all nl; 3/2022 lipid-hdl 42  Due for labs- none unless pt desires    Cholesterol   Date Value Ref Range Status   03/25/2022 163 0 - 199 mg/dL Final     Comment:     .      AGE      DESIRABLE   BORDERLINE HIGH   HIGH     0-19 Y     0 - 169       170 - 199     >/= 200    20-24 Y     0 - 189       190 - 224     >/= 225         >24 Y     0 - 199       200 - 239     >/= 240   **All ranges are based on fasting samples. Specific   therapeutic targets will vary based on patient-specific   cardiac risk.  .   Pediatric guidelines reference:Pediatrics 2011, 128(S5).   Adult guidelines reference: NCEP ATPIII Guidelines,     JENNIFER 2001, 258:2486-97  .   Venipuncture immediately after or during the    administration of Metamizole may lead to falsely   low results. Testing should be performed immediately   prior to Metamizole dosing.       Triglycerides   Date Value Ref Range Status   03/25/2022 110 0 - 149 mg/dL Final     Comment:     .      AGE      DESIRABLE   BORDERLINE HIGH   HIGH     VERY HIGH   0 D-90 D    19 - 174         ----         ----        ----  91 D- 9 Y     0 -  74        75 -  99     >/= 100      ----    10-19 Y     0 -  89        90 - 129     >/= 130      ----    20-24 Y     0 - 114       115 - 149     >/= 150      ----         >24 Y     0 -  149       150 - 199    200- 499    >/= 500  .   Venipuncture immediately after or during the    administration of Metamizole may lead to falsely   low results. Testing should be performed immediately   prior to Metamizole dosing.       HDL   Date Value Ref Range Status   03/25/2022 42.8 mg/dL Final     Comment:     .      AGE      VERY LOW   LOW     NORMAL    HIGH       0-19 Y       < 35   < 40     40-45     ----    20-24 Y       ----   < 40       >45     ----      >24 Y       ----   < 40     40-60      >60  .       No results found for: LDL  TSH   Date Value Ref Range Status   12/01/2022 0.80 0.44 - 3.98 mIU/L Final     Comment:      TSH testing is performed using different testing    methodology at [facility] than at other    [facility] hospitals. Direct result comparisons should    only be made within the same method.       No components found for: A1C  No components found for: POCA1C  No results found for: ALBUR  No components found for: POCALBUR      Other concerns:    ER/urgicare visits in the last year- none  Hospitalizations in the last year- none      last Pap- 1/2021; due 1/2024; sees midwife  H/o abn pap-a few yrs ago and next one normal    FH ovarian, endometrial, cervical, uterine ca-mat gr aunt-ovarian    Current birth control method-bcp  No change in contraception desired      FH br ca-mat gr aunt    FH colon ca-none    Exercise- 7d spinning  Diet-3-4 meals a day   Body mass index is 26.56 kg/m².    last eye dr appt- contacts; feb 2023  No vision issues    last dental appt- feb 2023    BMs-regular  Sleep-able to fall asleep and stay asleep; no snoring or apnea  no cp, swelling, sob, abd pain, n/v/d/c, blood in stool or black stools  STI testing including hiv (age 15-65) and hep c screening (18-79)-declines        Immunization History   Administered Date(s) Administered    Moderna SARS-CoV-2 Vaccination 08/11/2021, 09/08/2021    Tdap 06/27/2021       fractures in lifetime-none      FH heart  attack, heart surgery-mgf-mi and heart surgery  FH stroke-none    The ASCVD Risk score (Justin DK, et al., 2019) failed to calculate for the following reasons:    The 2019 ASCVD risk score is only valid for ages 40 to 79      Review of Systems   Constitutional:  Negative for appetite change, chills, fatigue, fever and unexpected weight change.   HENT:  Negative for congestion, ear pain, sore throat and trouble swallowing.    Eyes:  Negative for pain, discharge and redness.   Respiratory:  Negative for cough and shortness of breath.    Cardiovascular:  Negative for chest pain and palpitations.   Gastrointestinal:  Negative for abdominal pain, blood in stool and vomiting.   Endocrine: Negative for polydipsia, polyphagia and polyuria.   Genitourinary:  Negative for dysuria, frequency, hematuria and urgency.   Musculoskeletal:  Negative for back pain and neck pain.   Skin:  Negative for rash and wound.   Allergic/Immunologic: Negative for immunocompromised state.   Neurological:  Negative for dizziness, syncope and headaches.   Hematological:  Negative for adenopathy. Does not bruise/bleed easily.   Psychiatric/Behavioral:  Negative for confusion and hallucinations.        Objective   Visit Vitals  /76 (BP Location: Right arm, Patient Position: Sitting, BP Cuff Size: Adult)   Pulse 92   Temp 37 °C (98.6 °F)      BP Readings from Last 3 Encounters:   03/29/23 111/76   12/01/22 114/71   11/17/22 122/74     Wt Readings from Last 3 Encounters:   03/29/23 68 kg (149 lb 14.6 oz)   12/01/22 64.1 kg (141 lb 5 oz)   11/17/22 67.1 kg (148 lb)           Physical Exam  Constitutional:       General: She is not in acute distress.     Appearance: Normal appearance. She is not ill-appearing.   HENT:      Head: Normocephalic.      Right Ear: Tympanic membrane, ear canal and external ear normal.      Left Ear: Tympanic membrane, ear canal and external ear normal.      Nose: Nose normal.      Mouth/Throat:      Mouth: Mucous  membranes are moist.      Pharynx: Oropharynx is clear.   Eyes:      Extraocular Movements: Extraocular movements intact.      Conjunctiva/sclera: Conjunctivae normal.      Pupils: Pupils are equal, round, and reactive to light.   Cardiovascular:      Rate and Rhythm: Normal rate and regular rhythm.      Heart sounds: Normal heart sounds. No murmur heard.  Pulmonary:      Effort: Pulmonary effort is normal. No respiratory distress.      Breath sounds: Normal breath sounds. No wheezing, rhonchi or rales.   Abdominal:      General: Bowel sounds are normal.      Palpations: Abdomen is soft. There is no mass.      Tenderness: There is no abdominal tenderness.   Musculoskeletal:         General: No swelling or tenderness. Normal range of motion.      Cervical back: Normal range of motion and neck supple.      Right lower leg: No edema.      Left lower leg: No edema.   Skin:     General: Skin is warm.      Findings: No rash.   Neurological:      General: No focal deficit present.      Mental Status: She is alert and oriented to person, place, and time.      Cranial Nerves: No cranial nerve deficit.      Motor: No weakness.   Psychiatric:         Mood and Affect: Mood normal.         Behavior: Behavior normal.       Assessment/Plan   Diagnoses and all orders for this visit:  Routine general medical examination at a health care facility  Anxiety, generalized  -     FLUoxetine (PROzac) 10 mg capsule; Take 1 capsule (10 mg) by mouth once daily.  Mild episode of recurrent major depressive disorder (CMS/HCC)  -     FLUoxetine (PROzac) 10 mg capsule; Take 1 capsule (10 mg) by mouth once daily.  BMI 26.0-26.9,adult              High cholesterol  UNKNOWN MEDICINE  Stented coronary artery

## 2023-04-04 NOTE — ED PROVIDER NOTE - RELIEVING FACTORS
FYI- I changed the primary diagnosis code for the last visit to a welcome to Medicare, this was sent back to the billing department   pain resolved after 30 min

## 2023-09-13 PROBLEM — I25.10 ATHEROSCLEROTIC HEART DISEASE OF NATIVE CORONARY ARTERY WITHOUT ANGINA PECTORIS: Chronic | Status: ACTIVE | Noted: 2020-05-13

## 2023-09-13 PROBLEM — E78.00 PURE HYPERCHOLESTEROLEMIA, UNSPECIFIED: Chronic | Status: ACTIVE | Noted: 2020-05-13

## 2023-09-13 PROBLEM — I10 ESSENTIAL (PRIMARY) HYPERTENSION: Chronic | Status: ACTIVE | Noted: 2020-05-13

## 2023-12-19 ENCOUNTER — APPOINTMENT (OUTPATIENT)
Dept: GASTROENTEROLOGY | Facility: CLINIC | Age: 63
End: 2023-12-19

## 2024-03-15 NOTE — PATIENT PROFILE ADULT - NSPROMEDSPATCH_GEN_A_NUR
Patient in with a laceration to his right pointer finger . He cut it with wire while in class at [facility]  
none

## 2024-07-14 ENCOUNTER — NON-APPOINTMENT (OUTPATIENT)
Age: 64
End: 2024-07-14

## 2024-07-15 ENCOUNTER — EMERGENCY (EMERGENCY)
Facility: HOSPITAL | Age: 64
LOS: 1 days | Discharge: HOME | End: 2024-07-15
Attending: EMERGENCY MEDICINE
Payer: SELF-PAY

## 2024-07-15 VITALS
SYSTOLIC BLOOD PRESSURE: 142 MMHG | WEIGHT: 222.89 LBS | DIASTOLIC BLOOD PRESSURE: 84 MMHG | OXYGEN SATURATION: 96 % | TEMPERATURE: 99 F | RESPIRATION RATE: 18 BRPM | HEART RATE: 73 BPM

## 2024-07-15 DIAGNOSIS — Z95.5 PRESENCE OF CORONARY ANGIOPLASTY IMPLANT AND GRAFT: Chronic | ICD-10-CM

## 2024-07-15 PROCEDURE — 73130 X-RAY EXAM OF HAND: CPT | Mod: 26,LT

## 2024-07-15 PROCEDURE — 12002 RPR S/N/AX/GEN/TRNK2.6-7.5CM: CPT | Mod: F2

## 2024-07-15 PROCEDURE — 73130 X-RAY EXAM OF HAND: CPT

## 2024-07-15 PROCEDURE — 12002 RPR S/N/AX/GEN/TRNK2.6-7.5CM: CPT

## 2024-07-15 PROCEDURE — 99283 EMERGENCY DEPT VISIT LOW MDM: CPT

## 2024-07-15 PROCEDURE — 99284 EMERGENCY DEPT VISIT MOD MDM: CPT | Mod: 25

## 2024-07-15 PROCEDURE — T1013: CPT

## 2024-07-15 RX ORDER — CEPHALEXIN 500 MG
1 CAPSULE ORAL
Qty: 28 | Refills: 0
Start: 2024-07-15 | End: 2024-07-21

## 2024-07-15 RX ORDER — CEPHALEXIN 500 MG
500 CAPSULE ORAL ONCE
Refills: 0 | Status: COMPLETED | OUTPATIENT
Start: 2024-07-15 | End: 2024-07-15

## 2024-07-15 RX ADMIN — Medication 500 MILLIGRAM(S): at 19:13

## 2024-07-15 RX ADMIN — Medication 600 MILLIGRAM(S): at 19:13

## 2024-07-15 NOTE — ED ADULT NURSE NOTE - NSFALLUNIVINTERV_ED_ALL_ED
Bed/Stretcher in lowest position, wheels locked, appropriate side rails in place/Call bell, personal items and telephone in reach/Instruct patient to call for assistance before getting out of bed/chair/stretcher/Non-slip footwear applied when patient is off stretcher/[place] to call system/Physically safe environment - no spills, clutter or unnecessary equipment/Purposeful proactive rounding/Room/bathroom lighting operational, light cord in reach

## 2024-07-15 NOTE — ED ADULT NURSE NOTE - CAS ELECT INFOMATION PROVIDED
Patient discharged by provider DEBORAH Dillon. No signs of acute distress noted, respirations even and unlabored. Refer to provider notes./DC instructions

## 2024-07-15 NOTE — ED PROVIDER NOTE - CARE PROVIDERS DIRECT ADDRESSES
,DirectAddress_Unknown,DirectAddress_Unknown,[email or web address]

## 2024-07-15 NOTE — ED ADULT NURSE NOTE - OBJECTIVE STATEMENT
Assumed care of pt at 1748. Pt is AA&Ox4. Respirations are even and unlabored. Ambulatory, steady gait noted, MAEx4, NAD. Pt presents to ED c/o left hand pain s/p accidently cutting his hand in a . States he went to urgent care and was sent to ED for further eval. Pt denies any other complaints or injuries. ED provider evaluating, plan of care ongoing.

## 2024-07-15 NOTE — ED PROVIDER NOTE - PROVIDER TOKENS
PROVIDER:[TOKEN:[[id number]]],PROVIDER:[TOKEN:[[id number]]],PROVIDER:[TOKEN:[[id number]]]

## 2024-07-15 NOTE — ED PROVIDER NOTE - CARE PROVIDER_API CALL
Lacho Martinez  Orthopaedic Surgery  [address] [place] [address]  Phone: [phone]  Fax: [phone]  Follow Up Time:     JUAN CORRALES  [address] [place] [address]  Phone: ()-  Fax: ()-  Follow Up Time:     Joe Layne  Plastic Surgery  [address] [facility] [place] [address]  Phone: [phone]  Fax: [phone]  Follow Up Time:

## 2024-07-15 NOTE — ED PROVIDER NOTE - NSFOLLOWUPINSTRUCTIONS_ED_ALL_ED_FT
Laceration    A laceration is a cut that goes through all of the layers of the skin and into the tissue that is right under the skin. Some lacerations heal on their own. Others need to be closed with skin adhesive strips, skin glue, stitches (sutures), or staples. Proper laceration care minimizes the risk of infection and helps the laceration to heal better.  If non-absorbable stitches or staples have been placed, they must be taken out within the time frame instructed by your healthcare provider.    SEEK IMMEDIATE MEDICAL CARE IF YOU HAVE ANY OF THE FOLLOWING SYMPTOMS: swelling around the wound, worsening pain, drainage from the wound, red streaking going away from your wound, inability to move finger or toe near the laceration, or discoloration of skin near the laceration.     Please have your doctor within 48 hours.  Please follow-up with hand surgery within 4 days. Laceration    A laceration is a cut that goes through all of the layers of the skin and into the tissue that is right under the skin. Some lacerations heal on their own. Others need to be closed with skin adhesive strips, skin glue, stitches (sutures), or staples. Proper laceration care minimizes the risk of infection and helps the laceration to heal better.  If non-absorbable stitches or staples have been placed, they must be taken out within the time frame instructed by your healthcare provider.    SEEK IMMEDIATE MEDICAL CARE IF YOU HAVE ANY OF THE FOLLOWING SYMPTOMS: swelling around the wound, worsening pain, drainage from the wound, red streaking going away from your wound, inability to move finger or toe near the laceration, or discoloration of skin near the laceration.     Please have your doctor within 48 hours.  Please follow-up with hand surgery within 4 days.  Suture removal in 14 days

## 2024-07-15 NOTE — ED ADULT NURSE NOTE - NSICDXPASTMEDICALHX_GEN_ALL_CORE_FT
PAST MEDICAL HISTORY:  Coronary artery disease     Essential hypertension     Hypercholesterolemia

## 2024-07-15 NOTE — ED PROCEDURE NOTE - CPROC ED DIRECTIONAL
COVID-19 PCR test completed. Patient handout For Patients Who Have Been Tested for Covid-19 (Coronavirus) was given to the patient, which includes test result notification process.     left

## 2024-07-15 NOTE — ED PROVIDER NOTE - PATIENT PORTAL LINK FT
You can access the FollowMyHealth Patient Portal offered by [facility] by registering at the following website: [email or web address]. By joining [facility]’s FollowMyHealth portal, you will also be able to view your health information using other applications (apps) compatible with our system.
hair removal not indicated

## 2024-07-15 NOTE — ED PROVIDER NOTE - ATTENDING APP SHARED VISIT CONTRIBUTION OF CARE
I, Willem Neville MD, performed the initial face to face bedside interview with this patient regarding history of present illness, review of symptoms and relevant past medical, social and family history.  I completed an independent physical examination.  I was the initial provider who evaluated this patient. I have signed out the follow up of any pending tests (i.e. labs, radiological studies) to the ACP.  I have communicated the patient’s plan of care and disposition with the ACP.    left middle finger laceration, FROM, neurovascularly intact. repaired without complications

## 2024-08-12 NOTE — ED PROVIDER NOTE - PROGRESS NOTE DETAILS
11 Sujey: pt signed out to me, symptom free currently. [place] [facility]ology consulted by night team, to see pt. neg trop and ekg. placed in obs for serial trops and cardiology consult.

## 2024-09-10 ENCOUNTER — APPOINTMENT (OUTPATIENT)
Dept: UROLOGY | Facility: CLINIC | Age: 64
End: 2024-09-10
Payer: COMMERCIAL

## 2024-09-10 VITALS
RESPIRATION RATE: 16 BRPM | HEART RATE: 76 BPM | BODY MASS INDEX: 35.42 KG/M2 | DIASTOLIC BLOOD PRESSURE: 91 MMHG | OXYGEN SATURATION: 98 % | HEIGHT: 66.5 IN | WEIGHT: 223 LBS | SYSTOLIC BLOOD PRESSURE: 156 MMHG

## 2024-09-10 DIAGNOSIS — N52.9 MALE ERECTILE DYSFUNCTION, UNSPECIFIED: ICD-10-CM

## 2024-09-10 DIAGNOSIS — R68.82 DECREASED LIBIDO: ICD-10-CM

## 2024-09-10 DIAGNOSIS — R39.9 UNSPECIFIED SYMPTOMS AND SIGNS INVOLVING THE GENITOURINARY SYSTEM: ICD-10-CM

## 2024-09-10 PROCEDURE — 99203 OFFICE O/P NEW LOW 30 MIN: CPT

## 2024-09-10 RX ORDER — SILDENAFIL 50 MG/1
50 TABLET ORAL
Qty: 30 | Refills: 9 | Status: ACTIVE | COMMUNITY
Start: 2024-09-10 | End: 1900-01-01

## 2024-09-10 NOTE — HISTORY OF PRESENT ILLNESS
[FreeTextEntry1] : 64M w HTN, HLD, and CAD (s/p stent, on plavix/ASA) presents for low libido and premature ejaculation.  7/2024, started taking testosterone boosting pills. He stopped these after about a month.  Today, he reports worsening premature ejaculation and low libido. He denies ED, curvature, and LUTS.

## 2024-09-10 NOTE — ASSESSMENT
[FreeTextEntry1] : 64M w HTN, HLD, and CAD (s/p stent, on plavix/ASA) presents for low libido and premature ejaculation. We had a long discussion about the nature of these conditions and management options. He would like to check his testosterone level and start treatment for premature ejaculation.  -Sildenafil 50mg PRN -Promescent spray -AM testosterone level -RTC 1M

## 2024-09-16 LAB
APPEARANCE: CLEAR
BILIRUBIN URINE: NEGATIVE
BLOOD URINE: NEGATIVE
COLOR: YELLOW
GLUCOSE QUALITATIVE U: NEGATIVE MG/DL
KETONES URINE: NEGATIVE MG/DL
LEUKOCYTE ESTERASE URINE: NEGATIVE
NITRITE URINE: NEGATIVE
PH URINE: 5.5
PROTEIN URINE: NEGATIVE MG/DL
SPECIFIC GRAVITY URINE: 1.02
UROBILINOGEN URINE: 0.2 MG/DL

## 2024-09-18 LAB
TESTOST FREE SERPL-MCNC: 8.1 PG/ML
TESTOST SERPL-MCNC: 300 NG/DL

## 2024-10-08 ENCOUNTER — APPOINTMENT (OUTPATIENT)
Dept: UROLOGY | Facility: CLINIC | Age: 64
End: 2024-10-08
Payer: COMMERCIAL

## 2024-10-08 PROCEDURE — 99214 OFFICE O/P EST MOD 30 MIN: CPT

## 2024-11-05 ENCOUNTER — APPOINTMENT (OUTPATIENT)
Dept: UROLOGY | Facility: CLINIC | Age: 64
End: 2024-11-05
Payer: COMMERCIAL

## 2024-11-05 DIAGNOSIS — R79.89 OTHER SPECIFIED ABNORMAL FINDINGS OF BLOOD CHEMISTRY: ICD-10-CM

## 2024-11-05 PROCEDURE — 99443: CPT

## 2024-11-11 LAB — PROLACTIN SERPL-MCNC: 31.5 NG/ML

## 2024-11-14 ENCOUNTER — APPOINTMENT (OUTPATIENT)
Dept: MRI IMAGING | Facility: CLINIC | Age: 64
End: 2024-11-14
Payer: COMMERCIAL

## 2024-11-14 ENCOUNTER — TRANSCRIPTION ENCOUNTER (OUTPATIENT)
Age: 64
End: 2024-11-14

## 2024-11-14 ENCOUNTER — OUTPATIENT (OUTPATIENT)
Dept: OUTPATIENT SERVICES | Facility: HOSPITAL | Age: 64
LOS: 1 days | End: 2024-11-14

## 2024-11-14 DIAGNOSIS — R79.89 OTHER SPECIFIED ABNORMAL FINDINGS OF BLOOD CHEMISTRY: ICD-10-CM

## 2024-11-14 DIAGNOSIS — Z95.5 PRESENCE OF CORONARY ANGIOPLASTY IMPLANT AND GRAFT: Chronic | ICD-10-CM

## 2024-11-14 PROCEDURE — 70553 MRI BRAIN STEM W/O & W/DYE: CPT | Mod: 26

## 2024-12-05 ENCOUNTER — NON-APPOINTMENT (OUTPATIENT)
Age: 64
End: 2024-12-05

## 2024-12-05 ENCOUNTER — APPOINTMENT (OUTPATIENT)
Dept: NEUROSURGERY | Facility: CLINIC | Age: 64
End: 2024-12-05
Payer: COMMERCIAL

## 2024-12-05 VITALS
DIASTOLIC BLOOD PRESSURE: 74 MMHG | OXYGEN SATURATION: 96 % | TEMPERATURE: 98 F | HEART RATE: 64 BPM | WEIGHT: 220 LBS | HEIGHT: 66 IN | SYSTOLIC BLOOD PRESSURE: 116 MMHG | BODY MASS INDEX: 35.36 KG/M2

## 2024-12-05 DIAGNOSIS — E23.7 DISORDER OF PITUITARY GLAND, UNSPECIFIED: ICD-10-CM

## 2024-12-05 PROCEDURE — 99204 OFFICE O/P NEW MOD 45 MIN: CPT

## 2024-12-05 RX ORDER — SIMVASTATIN 20 MG/1
20 TABLET, FILM COATED ORAL
Refills: 0 | Status: ACTIVE | COMMUNITY

## 2024-12-05 RX ORDER — CLOPIDOGREL 75 MG/1
75 TABLET, FILM COATED ORAL
Refills: 0 | Status: ACTIVE | COMMUNITY

## 2024-12-05 RX ORDER — LOSARTAN POTASSIUM 50 MG/1
50 TABLET, FILM COATED ORAL
Refills: 0 | Status: ACTIVE | COMMUNITY

## 2024-12-05 RX ORDER — METOPROLOL TARTRATE 50 MG/1
50 TABLET ORAL
Refills: 0 | Status: ACTIVE | COMMUNITY

## 2024-12-05 RX ORDER — ASPIRIN 81 MG
81 TABLET,CHEWABLE ORAL
Refills: 0 | Status: ACTIVE | COMMUNITY

## 2024-12-27 ENCOUNTER — APPOINTMENT (OUTPATIENT)
Dept: UROLOGY | Facility: CLINIC | Age: 64
End: 2024-12-27
Payer: COMMERCIAL

## 2024-12-27 PROCEDURE — 99442: CPT

## 2025-01-31 ENCOUNTER — APPOINTMENT (OUTPATIENT)
Dept: MRI IMAGING | Facility: CLINIC | Age: 65
End: 2025-01-31

## 2025-04-11 ENCOUNTER — APPOINTMENT (OUTPATIENT)
Dept: UROLOGY | Facility: CLINIC | Age: 65
End: 2025-04-11
Payer: COMMERCIAL

## 2025-04-11 PROCEDURE — 99212 OFFICE O/P EST SF 10 MIN: CPT | Mod: 93

## 2025-04-22 ENCOUNTER — NON-APPOINTMENT (OUTPATIENT)
Age: 65
End: 2025-04-22

## 2025-04-23 ENCOUNTER — APPOINTMENT (OUTPATIENT)
Dept: ENDOCRINOLOGY | Facility: CLINIC | Age: 65
End: 2025-04-23
Payer: COMMERCIAL

## 2025-04-23 VITALS
HEART RATE: 60 BPM | BODY MASS INDEX: 36 KG/M2 | HEIGHT: 66 IN | OXYGEN SATURATION: 97 % | SYSTOLIC BLOOD PRESSURE: 137 MMHG | WEIGHT: 224 LBS | DIASTOLIC BLOOD PRESSURE: 80 MMHG

## 2025-04-23 DIAGNOSIS — E66.9 OBESITY, UNSPECIFIED: ICD-10-CM

## 2025-04-23 DIAGNOSIS — E22.1 HYPERPROLACTINEMIA: ICD-10-CM

## 2025-04-23 DIAGNOSIS — D35.2 BENIGN NEOPLASM OF PITUITARY GLAND: ICD-10-CM

## 2025-04-23 PROCEDURE — 99204 OFFICE O/P NEW MOD 45 MIN: CPT

## 2025-04-23 PROCEDURE — G2211 COMPLEX E/M VISIT ADD ON: CPT

## 2025-04-24 ENCOUNTER — NON-APPOINTMENT (OUTPATIENT)
Age: 65
End: 2025-04-24

## 2025-04-24 LAB
ACTH SER-ACNC: 58.6 PG/ML
ALBUMIN SERPL ELPH-MCNC: 4.2 G/DL
ALP BLD-CCNC: 89 U/L
ALT SERPL-CCNC: 31 U/L
ANION GAP SERPL CALC-SCNC: 14 MMOL/L
AST SERPL-CCNC: 25 U/L
BILIRUB SERPL-MCNC: 0.5 MG/DL
BUN SERPL-MCNC: 18 MG/DL
CALCIUM SERPL-MCNC: 9.4 MG/DL
CHLORIDE SERPL-SCNC: 102 MMOL/L
CO2 SERPL-SCNC: 25 MMOL/L
CORTIS SERPL-MCNC: 13.9 UG/DL
CREAT SERPL-MCNC: 1.13 MG/DL
EGFRCR SERPLBLD CKD-EPI 2021: 73 ML/MIN/1.73M2
FSH SERPL-MCNC: 4 IU/L
GLUCOSE SERPL-MCNC: 103 MG/DL
LH SERPL-ACNC: 4.3 IU/L
POTASSIUM SERPL-SCNC: 4.3 MMOL/L
PROLACTIN SERPL-MCNC: 18.6 NG/ML
PROT SERPL-MCNC: 6.7 G/DL
SHBG SERPL-SCNC: 30.2 NMOL/L
SODIUM SERPL-SCNC: 141 MMOL/L
T4 FREE SERPL-MCNC: 0.9 NG/DL
TSH SERPL-ACNC: 1.35 UIU/ML

## 2025-04-25 LAB
GH SERPL-MCNC: 0.06 NG/ML
TESTOST FREE SERPL-MCNC: 10.7 PG/ML
TESTOST SERPL-MCNC: 367 NG/DL

## 2025-04-28 LAB — IGF BP1 SERPL-MCNC: 85 NG/ML

## 2025-04-30 LAB
DHEA SERPL-MCNC: 238 NG/DL
PROLACTIN SERPL-MCNC: NORMAL NG/ML

## 2025-05-01 LAB — ESTRADIOL ULTRASENSITIVE: 33.5 PG/ML

## 2025-05-03 LAB
MONOMERIC PROLACTIN (ICMA)*: 16.6 NG/ML
PERCENT MACROPROLACTIN: 8 %
PROLACTIN, SERUM (ICMA)*: 18 NG/ML

## 2025-05-27 ENCOUNTER — OUTPATIENT (OUTPATIENT)
Dept: OUTPATIENT SERVICES | Facility: HOSPITAL | Age: 65
LOS: 1 days | End: 2025-05-27
Payer: COMMERCIAL

## 2025-05-27 ENCOUNTER — APPOINTMENT (OUTPATIENT)
Dept: MRI IMAGING | Facility: CLINIC | Age: 65
End: 2025-05-27

## 2025-05-27 DIAGNOSIS — E22.1 HYPERPROLACTINEMIA: ICD-10-CM

## 2025-05-27 DIAGNOSIS — Z95.5 PRESENCE OF CORONARY ANGIOPLASTY IMPLANT AND GRAFT: Chronic | ICD-10-CM

## 2025-05-27 PROCEDURE — 70553 MRI BRAIN STEM W/O & W/DYE: CPT | Mod: 26

## 2025-08-13 ENCOUNTER — APPOINTMENT (OUTPATIENT)
Dept: ENDOCRINOLOGY | Facility: CLINIC | Age: 65
End: 2025-08-13
Payer: COMMERCIAL

## 2025-08-13 VITALS — WEIGHT: 226 LBS | HEIGHT: 66 IN | OXYGEN SATURATION: 98 % | BODY MASS INDEX: 36.32 KG/M2 | HEART RATE: 67 BPM

## 2025-08-13 VITALS — SYSTOLIC BLOOD PRESSURE: 130 MMHG | DIASTOLIC BLOOD PRESSURE: 80 MMHG

## 2025-08-13 DIAGNOSIS — D35.2 BENIGN NEOPLASM OF PITUITARY GLAND: ICD-10-CM

## 2025-08-13 DIAGNOSIS — E23.7 DISORDER OF PITUITARY GLAND, UNSPECIFIED: ICD-10-CM

## 2025-08-13 DIAGNOSIS — E22.1 HYPERPROLACTINEMIA: ICD-10-CM

## 2025-08-13 PROCEDURE — G2211 COMPLEX E/M VISIT ADD ON: CPT

## 2025-08-13 PROCEDURE — 99214 OFFICE O/P EST MOD 30 MIN: CPT

## 2025-08-15 ENCOUNTER — TRANSCRIPTION ENCOUNTER (OUTPATIENT)
Age: 65
End: 2025-08-15

## 2025-09-10 ENCOUNTER — NON-APPOINTMENT (OUTPATIENT)
Age: 65
End: 2025-09-10